# Patient Record
Sex: FEMALE | Race: WHITE | NOT HISPANIC OR LATINO | Employment: FULL TIME | URBAN - METROPOLITAN AREA
[De-identification: names, ages, dates, MRNs, and addresses within clinical notes are randomized per-mention and may not be internally consistent; named-entity substitution may affect disease eponyms.]

---

## 2024-11-06 ENCOUNTER — TELEPHONE (OUTPATIENT)
Dept: PHYSICAL THERAPY | Facility: CLINIC | Age: 59
End: 2024-11-06

## 2024-11-08 ENCOUNTER — APPOINTMENT (OUTPATIENT)
Dept: PHYSICAL THERAPY | Facility: CLINIC | Age: 59
End: 2024-11-08
Payer: COMMERCIAL

## 2024-11-11 ENCOUNTER — APPOINTMENT (OUTPATIENT)
Dept: PHYSICAL THERAPY | Facility: CLINIC | Age: 59
End: 2024-11-11
Payer: COMMERCIAL

## 2024-11-11 ENCOUNTER — TELEPHONE (OUTPATIENT)
Dept: PHYSICAL THERAPY | Facility: CLINIC | Age: 59
End: 2024-11-11

## 2024-11-13 ENCOUNTER — EVALUATION (OUTPATIENT)
Dept: PHYSICAL THERAPY | Facility: CLINIC | Age: 59
End: 2024-11-13
Payer: COMMERCIAL

## 2024-11-13 DIAGNOSIS — M17.12 PRIMARY OSTEOARTHRITIS OF LEFT KNEE: Primary | ICD-10-CM

## 2024-11-13 PROCEDURE — 97112 NEUROMUSCULAR REEDUCATION: CPT

## 2024-11-13 PROCEDURE — 97162 PT EVAL MOD COMPLEX 30 MIN: CPT

## 2024-11-13 NOTE — LETTER
2024    Lucius Mcpherson DO  [address]  [address]  [facility] [address]    Patient: Karly Yost   YOB: 1965   Date of Visit: 2024     Encounter Diagnosis     ICD-10-CM    1. Primary osteoarthritis of left knee  M17.12           Dear Dr. Mcpherson:    Thank you for your recent referral of Karly Yost. Please review the attached evaluation summary from Karly's recent visit.     Please verify that you agree with the plan of care by signing the attached order.     If you have any questions or concerns, please do not hesitate to call.     I sincerely appreciate the opportunity to share in the care of one of your patients and hope to have another opportunity to work with you in the near future.       Sincerely,    Fransisco Randall, PT      Referring Provider:      I certify that I have read the below Plan of Care and certify the need for these services furnished under this plan of treatment while under my care.                    Lucius Mcpherson DO  [address]  [address]  [facility] [address]  Via Fax: [phone]          PT Evaluation     Today's date: 2024  Patient name: Karly Yost  : 1965  MRN: [id number]  Referring provider: Lucius Mcpherson DO  Dx: No diagnosis found.               Assessment  Impairments: abnormal gait, abnormal or restricted ROM, abnormal movement, activity intolerance, impaired physical strength, pain with function, poor body mechanics and activity limitations  Symptom irritability: moderate    Assessment details: Karly Yost is a 59 y.o. female who presents with L pain typically presenting distal to the patella and at times, medially. She also presents with decreased L knee and hip strength in addition to ambulatory dysfunction. Due to these impairments, patient has difficulty performing ADL's, walking to work, engaging in social activities, ambulation, stair negotiation, transfers, most notably sit to stands. Patient's clinical presentation is consistent with  their referring diagnosis of L knee OA in addition to potential L meniscal involvement. Pt presented w/ L knee joint line tenderness and reported catching/popping. This is in addition to a positive Charu's and Thessaly's test. Patient has been educated in home exercise program and plan of care. Patient would benefit from skilled physical therapy services to address their aforementioned functional limitations and progress towards prior level of function and independence with home exercise program in addition to a return to her [employer] office for work.  Understanding of Dx/Px/POC: good     Prognosis: good    Goals  Short Term Goals:  Target Date 4 weeks (12/11/24)  STG1. Initiate and advance HEP to maximize progress between therapy sessions.  STG2. Improve L hip strength to 4/5 to prevent L knee valgus during sit to stand transfer.  STG3. Reduce pain w/ WB activity including walking to <2/10 to improve community participation.    Long Term Goals:  Target Date 12 weeks (2/5/25)  LTG1. Pt to be Indep with HEP  to maximize progress between therapy sessions and improve functional mobility.  LTG2. Pt to tolerate prolonged walking on uneven terrain for 20+ minutes to return to work at her office.  LTG3. Pt to demo L hip strength of 4+/5 or better such that pt can perform reciprocal stair negotiation pain free.       Plan  Patient would benefit from: skilled physical therapy  Planned modality interventions: thermotherapy: hydrocollator packs and cryotherapy    Planned therapy interventions: balance/weight bearing training, body mechanics training, functional ROM exercises, gait training, home exercise program, therapeutic activities, therapeutic exercise, stretching, strengthening, neuromuscular re-education and manual therapy    Frequency: 2x week  Duration in weeks: 12  Plan of Care beginning date: 11/13/2024  Plan of Care expiration date: 2/5/2025    Subjective Evaluation    History of Present Illness  Date of onset:  "10/11/2024  Mechanism of injury: Pt reports to therapy rating her L knee pain as a 3-4/10 currently. Pt has had L knee pain \"on and off\" for a few years. This pain typically presents slightly distal to the patella, but can also present medially. Pt does not recall a particular motion that brings about this pain more than others, but adds that walking and stair climbing has been her more consistent sources of pain to date. Pt is currently working from home, but [facility]. She has to walk 40+minutes for work daily when in the city, and states that she stopped doing so because her knee pain grew too severe. Pt received a Cortizone shot to the L knee on 10/11 which created \"excruciating\" pain at first, but began to reduce pain 48 hours after admission. Following this injection she notes that she had two weeks of \"0/10\" pain. She returned to [place] for work because of this, but walking to work brought about her 4/10 pain. This is what caused the patient to look further into her knee pain and eventually attend PT.           Recurrent probem    Quality of life: good    Patient Goals  Patient goals for therapy: increased strength, independence with ADLs/IADLs, return to work, increased motion, improved balance and decreased pain  Patient goal: [place] shopping w/the family  Pain  Current pain rating: 3  At best pain ratin  At worst pain rating: 10  Location: Distal to L patella, posterior L knee.  Quality: dull ache and pressure  Relieving factors: rest and change in position  Aggravating factors: walking, stair climbing and standing  Progression: worsening    Social Support  Steps to enter house: yes  4  Stairs in house: yes   16  Lives in: multiple-level home  Lives with: spouse    Employment status: working  Hand dominance: right    Treatments  Previous treatment: injection treatment and medication (Meloxicam)    Objective    (*=pain)      Knee AROM: Degrees      R  L  Extension(Quad " set):  0  0  Extension Lag (SLR):   -1  -3  Flexion: (Supine/Prone) 122  117      Strength: MMT  Hip    R  L    IR(Seated/Supine)  4/5*  4+/5*  ER(Seated/Supine)  4+/5  4-/5   Flexion(Seated/Supine) 4/5  4-/5*  Abduction(S/L)  4-/5*  4-/5*  Abduction GM bias  3+/5*  3+/5*    Knee    R  L    Extension(Seated)  4/5*  4-/5*  Flexion(Seated)  5/5  5/5    Ankle    R  L   Dorsiflexion(Seated)  4+/5*  4-/5  Plantarflexion(Seated) 5/5  5/5    L/S Mobility:    Flexion: Min loss  Ext: No loss  Side bend L: Min loss w/pain  Side bend R: No loss    Mechanical Assessment L/S:    Pre-test symptoms include: 4/10 pain to L knee when walking  Repeated Extension in Standing (ANYI): 10x, no change, 10x no change  Repeated Extension in Lying (REIL):  10x, no change    Observation:  Pt has mild-mod clicking in L knee upon rising from chair in addition to L knee valgus.    Tenderness/Palpation:    Pt has moderate TTP to mid joint line of L knee    Special Tests:    Valgus Stress: (-)  Varus Stress: (+)  McMurrary's IR of the tibia + Varus stress = lateral meniscus: (-)  McMurrays ER of the tibia + Valgus stress = medial meniscus: (+)  Thessaly's: (+) on Right  Lachman: (-)    Function:   Squatting: Pt demonstrates L knee valgus upon rising from a chair  Ambulation: Pt demonstrates mild L lean during stance phase on L         Precautions: No past medical history on file.       SOC: 11/13/24  FOTO: 11/13/24  POC Expiration: 2/5/25  Daily Treatment Log:  Date 11/13/24       Visit # 1;IE       Auth         Auth exp        Manual                        There Exer        Squat/STS        Hooklying pullover        Glute bridges                                        HEP        There Activ        Side stepping        Monster walks        Step Ups/Downs                                NMReed 15'       Side lying hip abduction; GM bias 2x10 at rail       Standing hip abduction 2x10 at rail       Standing hip extension 2x10 at rail        Knee ext.  W/add 10x       PPT                        Modalities                                HEP:   Access Code: [email or web address]  URL: [email or web address]  Date: 11/13/2024  Prepared by: Fransisco Randall    Exercises  - Sidelying Hip Abduction  - 1-2 x daily - 7 x weekly - 2-3 sets - 10 reps  - Standing Hip Extension with Counter Support  - 1-2 x daily - 7 x weekly - 2-3 sets - 10 reps

## 2024-11-13 NOTE — PROGRESS NOTES
PT Evaluation     Today's date: 11/15/2024  Patient name: Karly Yost  : 1965  MRN: [id number]  Referring provider: Lucius Mcpherson DO  Dx:   Encounter Diagnosis     ICD-10-CM    1. Primary osteoarthritis of left knee  M17.12           Start Time: 1700  Stop Time: 1745  Total time in clinic (min): 45 minutes    Assessment  Impairments: abnormal gait, abnormal or restricted ROM, abnormal movement, activity intolerance, impaired physical strength, pain with function, poor body mechanics and activity limitations  Symptom irritability: moderate    Assessment details: Karly Yost is a 59 y.o. female who presents with L pain typically presenting distal to the patella and at times, medially. She also presents with decreased L knee and hip strength in addition to ambulatory dysfunction. Due to these impairments, patient has difficulty performing ADL's, walking to work, engaging in social activities, ambulation, stair negotiation, transfers, most notably sit to stands. Patient's clinical presentation is consistent with their referring diagnosis of L knee OA in addition to potential L meniscal involvement. Pt presented w/ L knee joint line tenderness and reported catching/popping. This is in addition to a positive Charu's and Thessaly's test. Patient has been educated in home exercise program and plan of care. Patient would benefit from skilled physical therapy services to address their aforementioned functional limitations and progress towards prior level of function and independence with home exercise program in addition to a return to her [facility] office for work.  Understanding of Dx/Px/POC: good     Prognosis: good    Goals  Short Term Goals:  Target Date 4 weeks (24)  STG1. Initiate and advance HEP to maximize progress between therapy sessions.  STG2. Improve L hip strength to 4/5 to prevent L knee valgus during sit to stand transfer.  STG3. Reduce pain w/ WB activity including walking to <2/10 to improve  "community participation.    Long Term Goals:  Target Date 12 weeks (2/5/25)  LTG1. Pt to be Indep with HEP  to maximize progress between therapy sessions and improve functional mobility.  LTG2. Pt to tolerate prolonged walking on uneven terrain for 20+ minutes to return to work at her office.  LTG3. Pt to demo L hip strength of 4+/5 or better such that pt can perform reciprocal stair negotiation pain free.       Plan  Patient would benefit from: skilled physical therapy  Planned modality interventions: thermotherapy: hydrocollator packs and cryotherapy    Planned therapy interventions: balance/weight bearing training, body mechanics training, functional ROM exercises, gait training, home exercise program, therapeutic activities, therapeutic exercise, stretching, strengthening, neuromuscular re-education and manual therapy    Frequency: 2x week  Duration in weeks: 12  Plan of Care beginning date: 11/13/2024  Plan of Care expiration date: 2/5/2025      Subjective Evaluation    History of Present Illness  Date of onset: 10/11/2024  Mechanism of injury: Pt reports to therapy rating her L knee pain as a 3-4/10 currently. Pt has had L knee pain \"on and off\" for a few years. This pain typically presents slightly distal to the patella, but can also present medially. Pt does not recall a particular motion that brings about this pain more than others, but adds that walking and stair climbing has been her more consistent sources of pain to date. Pt is currently working from home, but typically commutes to [facility] for work. She has to walk 40+minutes for work daily when in the city, and states that she stopped doing so because her knee pain grew too severe. Pt received a Cortizone shot to the L knee on 10/11 which created \"excruciating\" pain at first, but began to reduce pain 48 hours after admission. Following this injection she notes that she had two weeks of \"0/10\" pain. She returned to [place] for work because of this, but walking " to work brought about her 4/10 pain. This is what caused the patient to look further into her knee pain and eventually attend PT.           Recurrent probem    Quality of life: good    Patient Goals  Patient goals for therapy: increased strength, independence with ADLs/IADLs, return to work, increased motion, improved balance and decreased pain  Patient goal: [place] shopping w/the family  Pain  Current pain rating: 3  At best pain ratin  At worst pain rating: 10  Location: Distal to L patella, posterior L knee.  Quality: dull ache and pressure  Relieving factors: rest and change in position  Aggravating factors: walking, stair climbing and standing  Progression: worsening    Social Support  Steps to enter house: yes  4  Stairs in house: yes   16  Lives in: multiple-level home  Lives with: spouse    Employment status: working  Hand dominance: right    Treatments  Previous treatment: injection treatment and medication (Meloxicam)      Objective    (*=pain)      Knee AROM: Degrees      R  L  Extension(Quad set):  0  0  Extension Lag (SLR):   -1  -3  Flexion: (Supine/Prone) 122  117      Strength: MMT  Hip    R  L    IR(Seated/Supine)  4/5*  4+/5*  ER(Seated/Supine)  4+/5  4-/5   Flexion(Seated/Supine) 4/5  4-/5*  Abduction(S/L)  4-/5*  4-/5*  Abduction GM bias  3+/5*  3+/5*    Knee    R  L    Extension(Seated)  4/5*  4-/5*  Flexion(Seated)  5/5  5/5    Ankle    R  L   Dorsiflexion(Seated)  4+/5*  4-/5  Plantarflexion(Seated) 5/5  5/5    L/S Mobility:    Flexion: Min loss  Ext: No loss  Side bend L: Min loss w/pain  Side bend R: No loss    Mechanical Assessment L/S:    Pre-test symptoms include: 4/10 pain to L knee when walking  Repeated Extension in Standing (ANYI): 10x, no change, 10x no change  Repeated Extension in Lying (REIL):  10x, no change    Observation:  Pt has mild-mod clicking in L knee upon rising from chair in addition to L knee valgus.    Tenderness/Palpation:    Pt has moderate TTP to mid joint  line of L knee    Special Tests:    Valgus Stress: (-)  Varus Stress: (+)  McMurrary's IR of the tibia + Varus stress = lateral meniscus: (-)  McMurrays ER of the tibia + Valgus stress = medial meniscus: (+)  Thessaly's: (+) on Right  Lachman: (-)    Function:   Squatting: Pt demonstrates L knee valgus upon rising from a chair  Ambulation: Pt demonstrates mild L lean during stance phase on L         Precautions: History reviewed. No pertinent past medical history.       SOC: 11/13/24  FOTO: 11/13/24  POC Expiration: 2/5/25  Daily Treatment Log:  Date 11/13/24       Visit # 1;IE       Auth         Auth exp        Manual                        There Exer        Squat/STS        Hooklying pullover        Glute bridges                                        HEP        There Activ        Side stepping        Monster walks        Step Ups/Downs                                NMReed 15'       Side lying hip abduction; GM bias 2x10 at rail       Standing hip abduction 2x10 at rail       Standing hip extension 2x10 at rail        Knee ext. W/add 10x       PPT                        Modalities                                HEP:   Access Code: [email or web address]  URL: [email or web address]  Date: 11/13/2024  Prepared by: Fransisco Randall    Exercises  - Sidelying Hip Abduction  - 1-2 x daily - 7 x weekly - 2-3 sets - 10 reps  - Standing Hip Extension with Counter Support  - 1-2 x daily - 7 x weekly - 2-3 sets - 10 reps

## 2024-11-15 ENCOUNTER — OFFICE VISIT (OUTPATIENT)
Dept: PHYSICAL THERAPY | Facility: CLINIC | Age: 59
End: 2024-11-15
Payer: COMMERCIAL

## 2024-11-15 DIAGNOSIS — M17.12 PRIMARY OSTEOARTHRITIS OF LEFT KNEE: Primary | ICD-10-CM

## 2024-11-15 PROCEDURE — 97112 NEUROMUSCULAR REEDUCATION: CPT

## 2024-11-15 PROCEDURE — 97110 THERAPEUTIC EXERCISES: CPT

## 2024-11-15 PROCEDURE — 97530 THERAPEUTIC ACTIVITIES: CPT

## 2024-11-15 NOTE — PROGRESS NOTES
"Daily Note     Today's date: 11/15/2024  Patient name: Karly Yost  : 1965  MRN: [id number]  Referring provider: Lucius Mcpherson DO  Dx:   Encounter Diagnosis     ICD-10-CM    1. Primary osteoarthritis of left knee  M17.12                      Subjective: pt reports that she had to go into the city for work yesterday it bothered her by the end of the day 2/10 but she relaxed after that and was okay. On arrival to session she has some pressure in L knee but wouldn't call it pain.      Objective: See treatment diary below  3/10 pain w/ LAQ after STS   Repeated L knee ext w/ self OP 1x5= pain during; NE on pain during LAQ      Assessment: Tolerated treatment well. Increased pain post session 3/10 w/ TE. Pt did dem some relief w/ KT to her medial knee, pt to cont wear for 2-3 days and assess for cont relief. Edu on wear time and removal of taping. Cont to progress global LE strengthening as tolerated per symptom irritability.  Patient would benefit from continued PT      Plan: Continue per plan of care.      Precautions: History reviewed. No pertinent past medical history.       SOC: 24  FOTO: 24  POC Expiration: 25  Daily Treatment Log:  Date 11/13/24 11/15/2024      Visit # 1;IE 2       Auth         Auth exp        Manual        KT medial \"C\" L knee   RB               There Exer  15'       Squat/STS  STS elevated 25\" mat 1x10       Hooklying pullover        Hooklying clam shells   RTB 1x10       Glute bridges  1x10       SLR flex w/ quad set  1x10                              HEP        There Activ  10'       Upright bike  L2x5'       Side stepping  15'       Monster walks  15'       Step Ups/Downs                                NMReed 15' 13'      Side lying hip abduction; GM bias 2x10 at rail       Standing hip abduction 2x10 at rail 1x10       Standing hip extension 2x10 at rail  1x10       Knee ext. W/add 10x 2x10       PPT                        Modalities                                HEP: "   Access Code: [email or web address]  URL: [email or web address]  Date: 11/13/2024  Prepared by: Fransisco Randall    Exercises  - Sidelying Hip Abduction  - 1-2 x daily - 7 x weekly - 2-3 sets - 10 reps  - Standing Hip Extension with Counter Support  - 1-2 x daily - 7 x weekly - 2-3 sets - 10 reps

## 2024-11-18 ENCOUNTER — OFFICE VISIT (OUTPATIENT)
Dept: PHYSICAL THERAPY | Facility: CLINIC | Age: 59
End: 2024-11-18
Payer: COMMERCIAL

## 2024-11-18 DIAGNOSIS — M17.12 PRIMARY OSTEOARTHRITIS OF LEFT KNEE: Primary | ICD-10-CM

## 2024-11-18 PROCEDURE — 97110 THERAPEUTIC EXERCISES: CPT

## 2024-11-18 PROCEDURE — 97112 NEUROMUSCULAR REEDUCATION: CPT

## 2024-11-18 NOTE — PROGRESS NOTES
"Daily Note     Today's date: 2024  Patient name: Karly Yost  : 1965  MRN: [id number]  Referring provider: Lucius Mcpherson DO  Dx:   Encounter Diagnosis     ICD-10-CM    1. Primary osteoarthritis of left knee  M17.12                      Subjective: Pt reports to therapy stating that she is feeling \"much better\" noting that she can even experience times of 0/10 pain, though typically her pain rests at a consistent 2/10 and can spike to 3-4/10 following prolonged sitting and/or stair climbing.    Objective: See treatment diary below      Assessment: Tolerated treatment well. Pt performed both side-stepping and monster walks w/resistance and had no c/o pain or discomfort when doing so. TRX squats were also added to pt's plan to reinforce glute recruitment during this motion while managing an external load. Patient would benefit from continued PT to reduce pain in her L knee while increasing stability of both the L knee and hip joint to improve activity tolerance. PT continues to experience soreness following work days and walking in the city, so plan to stagger in-person work days to rest her knee was discussed and will be attempted by pt. HEP updated to include side-stepping, monster walks, and sit to stand interventions.      Plan: Continue per plan of care.      Precautions: History reviewed. No pertinent past medical history.       SOC: 24  FOTO: 24  POC Expiration: 25  Daily Treatment Log:  Date 11/13/24 11/15/2024 11/18/24     Visit # 1;IE 2  3     Auth         Auth exp        Manual        KT medial \"C\" L knee   RB               There Exer  15'  10'     Squat/STS  STS elevated 25\" mat 1x10  STS elevated 25\" mat 1x10      TRX squat   2x10     Hooklying pullover        Hooklying clam shells   RTB 1x10       Glute bridges  1x10       SLR flex w/ quad set  1x10                              HEP   Updated     There Activ  10'  10'     Upright bike  L2x5'  L2x5'      Side stepping  15'  15', " 3 laps, YTB at ankles     Monster walks  15'  15', 3 laps, YTB     Step Ups/Downs                                NMReed 15' 13' 25'     Side lying hip abduction; GM bias 2x10 at rail  2x10 in side lying      Standing hip abduction 2x10 at rail 1x10       Standing hip extension 2x10 at rail  1x10  2x10     Knee ext. W/add 10x 2x10  2x10      PPT   10x3s     HL cane pullover   10x w/SPC             Modalities                                Access Code: [email or web address]  URL: [email or web address]  Date: 11/18/2024  Prepared by: Fransisco Randall    Exercises  - Sidelying Hip Abduction  - 1-2 x daily - 7 x weekly - 2-3 sets - 10 reps  - Standing Hip Extension with Counter Support  - 1-2 x daily - 7 x weekly - 2-3 sets - 10 reps  - Side Stepping with Resistance at Ankles  - 1-2 x daily - 7 x weekly - 2-3 sets - 10 reps  - Forward Monster Walk with Resistance (BKA)  - 1-2 x daily - 7 x weekly - 2-3 sets - 10 reps  - Sit to Stand  - 1-2 x daily - 7 x weekly - 2-3 sets - 10 reps

## 2024-11-20 ENCOUNTER — OFFICE VISIT (OUTPATIENT)
Dept: PHYSICAL THERAPY | Facility: CLINIC | Age: 59
End: 2024-11-20
Payer: COMMERCIAL

## 2024-11-20 DIAGNOSIS — M17.12 PRIMARY OSTEOARTHRITIS OF LEFT KNEE: Primary | ICD-10-CM

## 2024-11-20 PROCEDURE — 97112 NEUROMUSCULAR REEDUCATION: CPT

## 2024-11-20 PROCEDURE — 97110 THERAPEUTIC EXERCISES: CPT

## 2024-11-20 PROCEDURE — 97530 THERAPEUTIC ACTIVITIES: CPT

## 2024-11-20 NOTE — PROGRESS NOTES
"Daily Note     Today's date: 2024  Patient name: Karly Yost  : 1965  MRN: [id number]  Referring provider: Lucius Mcpherson DO  Dx:   Encounter Diagnosis     ICD-10-CM    1. Primary osteoarthritis of left knee  M17.12                      Subjective: Pt reports to therapy citing a 3/10 pain that increased to a 4/10 following the past two straight days of walking [place] the city for [place]. Pt adds that she had to ascend 35 stairs in [place] on these days. This pain presents primarily superior to the L patella and migrates medially as it intensifies. Pt denies any buckling or LOB.      Objective: See treatment diary below      Assessment: Tolerated treatment well. Pt performed a step up intervention w/theraband wrapped around L knee and pulled medially to encourage L abductors/glute recruitment to enhance stability around the knee. Pt reported less discomfort with this method than w/o theraband. Additionally, pt found relief from PT L knee distraction that reduced pain during LAQ. Patient would benefit from continued PT to enhance both L knee and hip stability while increasing activity tolerance to improve community ambulation.       Plan: Continue per plan of care.      Precautions: History reviewed. No pertinent past medical history.       SOC: 24  FOTO: 24  POC Expiration: 25  Daily Treatment Log:  Date 11/13/24 11/15/2024 11/18/24 11/20    Visit # 1;IE 2  3 4    Auth         Auth exp        Manual    5'    KT medial \"C\" L knee   RB       L knee distraction w/band    SJ            There Exer  15'  10' 10'    Squat/STS  STS elevated 25\" mat 1x10  STS elevated 25\" mat 1x10      TRX squat   2x10 2x10 to chair    Hooklying pullover        Hooklying clam shells   RTB 1x10       Glute bridges  1x10   [id number]; 1x10w/RTB and PPT    SLR flex w/ quad set                                HEP   Updated     There Activ  10'  10' 15'    Upright bike  L2x5'  L2x5'      Side stepping  15'  15', 3 laps, YTB " at ankles 15', 3 laps, YTB at ankles    Monster walks including retro walks  15'  15', 3 laps, YTB 15', 3 laps, YTB at ankles    Step Ups/Downs    Step ups w/PT ABduction resistance via YTB                            NMReed 15' 13' 25' 15'    Side lying hip abduction; GM bias 2x10 at rail  2x10 in side lying  2x10 w/1#    Standing hip abduction 2x10 at rail 1x10       Standing hip extension 2x10 at rail  1x10  2x10     Knee ext. W/add 10x 2x10  2x10  2x10; min-mod pain to medial R knee, relieved by distraction    PPT   10x3s 10x3s    HL cane pullover   10x w/SPC             Modalities                                Access Code: [email or web address]  URL: [email or web address]  Date: 11/18/2024  Prepared by: Fransisco Randall    Exercises  - Sidelying Hip Abduction  - 1-2 x daily - 7 x weekly - 2-3 sets - 10 reps  - Standing Hip Extension with Counter Support  - 1-2 x daily - 7 x weekly - 2-3 sets - 10 reps  - Side Stepping with Resistance at Ankles  - 1-2 x daily - 7 x weekly - 2-3 sets - 10 reps  - Forward Monster Walk with Resistance (BKA)  - 1-2 x daily - 7 x weekly - 2-3 sets - 10 reps  - Sit to Stand  - 1-2 x daily - 7 x weekly - 2-3 sets - 10 reps

## 2024-11-22 ENCOUNTER — OFFICE VISIT (OUTPATIENT)
Dept: PHYSICAL THERAPY | Facility: CLINIC | Age: 59
End: 2024-11-22
Payer: COMMERCIAL

## 2024-11-22 DIAGNOSIS — M17.12 PRIMARY OSTEOARTHRITIS OF LEFT KNEE: Primary | ICD-10-CM

## 2024-11-22 PROCEDURE — 97110 THERAPEUTIC EXERCISES: CPT

## 2024-11-22 PROCEDURE — 97530 THERAPEUTIC ACTIVITIES: CPT

## 2024-11-22 PROCEDURE — 97112 NEUROMUSCULAR REEDUCATION: CPT

## 2024-11-22 NOTE — PROGRESS NOTES
"Daily Note     Today's date: 2024  Patient name: Karly Yost  : 1965  MRN: [id number]  Referring provider: Lucius Mcpherson DO  Dx:   Encounter Diagnosis     ICD-10-CM    1. Primary osteoarthritis of left knee  M17.12                      Subjective: Pt reports to therapy feeling \"0-1/10\" pain in the L knee at rest, but this pain can increase to \"3-4/10\"  following increased activity, such as walking for long distances and/or stair negotiations.       Objective: See treatment diary below      Assessment: Tolerated treatment well. Pt performed side stepping today w/ increased resistance in addition to UE supported squats w/1s pause at bottom. No c/o pain or discomfort were made, and pt displays strong understanding of maintaining proper form. Pt continues to find relief from L knee distraction w/blue belt. Patient would benefit from continued PT to increase activity tolerance of L knee.       Plan: Continue per plan of care.       Precautions: History reviewed. No pertinent past medical history.       SOC: 24  FOTO: 24  POC Expiration: 25  Daily Treatment Log:  Date 11/13/24 11/15/2024 11/18/24 11/20 11/22/24   Visit # 1;IE 2  3 4 5; FOTO   Auth         Auth exp        Manual    5' 5'   KT medial \"C\" L knee   RB       L knee distraction w/band    SJ SJ           There Exer  15'  10' 10' 5'   Squat/STS  STS elevated 25\" mat 1x10  STS elevated 25\" mat 1x10      TRX squat   2x10 2x10 to chair 2x10 to chair; 1s pause at bottom   Hooklying pullover        Hooklying clam shells   RTB 1x10       Glute bridges  1x10   [id number]; 1x10w/RTB and PPT    SLR flex w/ quad set                                HEP   Updated     There Activ  10'  10' 15' 20'   Upright bike  L2x5'  L2x5'   Recumbent; L2x5'    Side stepping  15'  15', 3 laps, YTB at ankles 15', 3 laps, YTB at ankles 15', 3 laps, RTB at ankles   Monster walks including retro walks  15'  15', 3 laps, YTB 15', 3 laps, YTB at ankles    Step Ups/Downs "    Step ups w/PT ABduction resistance via YTB 10x on R/L   Lateral Step ups/downs     10x R/L                   NMReed 15' 13' 25' 15' 15'   Side lying hip abduction; GM bias 2x10 at rail  2x10 in side lying  2x10 w/1# 2x10 w/1# in standing at counter   Standing hip abduction 2x10 at rail 1x10       Standing hip extension 2x10 at rail  1x10  2x10  2x10 w/1# in standing at counter   Knee ext. W/add 10x 2x10  2x10  2x10; min-mod pain to medial R knee, relieved by distraction 2x10; min-mod pain to medial R knee, relieved by distraction   PPT   10x3s 10x3s    HL cane pullover   10x w/SPC     Palloff Press w/band around ankles                Modalities                                Access Code: [email or web address]  URL: [email or web address]  Date: 11/18/2024  Prepared by: Fransisco Randall    Exercises  - Sidelying Hip Abduction  - 1-2 x daily - 7 x weekly - 2-3 sets - 10 reps  - Standing Hip Extension with Counter Support  - 1-2 x daily - 7 x weekly - 2-3 sets - 10 reps  - Side Stepping with Resistance at Ankles  - 1-2 x daily - 7 x weekly - 2-3 sets - 10 reps  - Forward Monster Walk with Resistance (BKA)  - 1-2 x daily - 7 x weekly - 2-3 sets - 10 reps  - Sit to Stand  - 1-2 x daily - 7 x weekly - 2-3 sets - 10 reps

## 2024-11-25 ENCOUNTER — OFFICE VISIT (OUTPATIENT)
Dept: PHYSICAL THERAPY | Facility: CLINIC | Age: 59
End: 2024-11-25
Payer: COMMERCIAL

## 2024-11-25 DIAGNOSIS — M17.12 PRIMARY OSTEOARTHRITIS OF LEFT KNEE: Primary | ICD-10-CM

## 2024-11-25 PROCEDURE — 97110 THERAPEUTIC EXERCISES: CPT

## 2024-11-25 PROCEDURE — 97112 NEUROMUSCULAR REEDUCATION: CPT

## 2024-11-25 PROCEDURE — 97530 THERAPEUTIC ACTIVITIES: CPT

## 2024-11-25 NOTE — PROGRESS NOTES
"Daily Note     Today's date: 2024  Patient name: Karly Yost  : 1965  MRN: [id number]  Referring provider: Lucius Mcpherson DO  Dx:   Encounter Diagnosis     ICD-10-CM    1. Primary osteoarthritis of left knee  M17.12                      Subjective: Pt reports to therapy stating that she \"might have over done it\" when going out w/family this past Friday night. PT adds that she was on her feet for 4+ hours and her pain increased \"to around a 4/10\" and presented superior to the patella in addition to the medial side of the L knee. Pt notes that this pain has decreased with rest and elevation.       Objective: See treatment diary below      Assessment: Tolerated treatment well. Pt demonstrates strong understanding of how to involve her hips when performing a step-up intervention following VCs. Pt found pain relief during step up when using this technique from 3/10 to 0/10. Patient would benefit from continued PT to ensure proper LE mechanics during all functional motions including tranfers and gait training. Pt continues to find relief using blue belt distraction technique by PT.       Plan: Continue per plan of care.      Precautions: History reviewed. No pertinent past medical history.       SOC: 24  FOTO: 24  POC Expiration: 25  Daily Treatment Log:  Date 11/25/24 11/15/2024 11/18/24 11/20 11/22/24   Visit # 6 2  3 4 5; FOTO   Auth         Auth exp        Manual    5' 5'   KT medial \"C\" L knee   RB       L knee distraction w/band SJ   SJ SJ           There Exer 10' 15'  10' 10' 5'   Squat/STS  STS elevated 25\" mat 1x10  STS elevated 25\" mat 1x10      TRX squat 10x w/GTB at ankles; 10x w/GTB and 1sec pause at bottom  2x10 2x10 to chair 2x10 to chair; 1s pause at bottom   Hooklying pullover        Hooklying clam shells  U/L w/RTB; 2x10 R/L RTB 1x10       Glute bridges  1x10   [id number]; 1x10w/RTB and PPT    SLR flex w/ quad set                                HEP   Updated     There Activ 25' " "10'  10' 15' 20'   Upright bike Recumbent; L2x5'  L2x5'  L2x5'   Recumbent; L2x5'    Side stepping 15', 3 laps, GTB at ankles 15'  15', 3 laps, YTB at ankles 15', 3 laps, YTB at ankles 15', 3 laps, RTB at ankles   Monster walks including retro walks W/HARJIT; 12.5#, 5 laps 15'  15', 3 laps, YTB 15', 3 laps, YTB at ankles    Step Ups/Downs 8\",    Step ups w/PT ABduction resistance via YTB 10x on R/L   Lateral Step ups/downs 10x R/L    10x R/L                   NMReed 10' 13' 25' 15' 15'   Side lying hip abduction; GM bias   2x10 in side lying  2x10 w/1# 2x10 w/1# in standing at counter   Standing hip abduction  1x10       Standing hip extension  1x10  2x10  2x10 w/1# in standing at counter   Knee ext. W/add 2x10 w1#; min-mod pain to medial R knee, relieved by distraction 2x10  2x10  2x10; min-mod pain to medial R knee, relieved by distraction 2x10; min-mod pain to medial R knee, relieved by distraction   PPT   10x3s 10x3s    HL cane pullover   10x w/SPC     Palloff Press w/band around ankles W/HARJIT; 7.5# 2x10 R/L               Modalities                                Access Code: [email or web address]  URL: [email or web address]  Date: 11/18/2024  Prepared by: Fransisco Randall    Exercises  - Sidelying Hip Abduction  - 1-2 x daily - 7 x weekly - 2-3 sets - 10 reps  - Standing Hip Extension with Counter Support  - 1-2 x daily - 7 x weekly - 2-3 sets - 10 reps  - Side Stepping with Resistance at Ankles  - 1-2 x daily - 7 x weekly - 2-3 sets - 10 reps  - Forward Monster Walk with Resistance (BKA)  - 1-2 x daily - 7 x weekly - 2-3 sets - 10 reps  - Sit to Stand  - 1-2 x daily - 7 x weekly - 2-3 sets - 10 reps                  "

## 2024-11-27 ENCOUNTER — OFFICE VISIT (OUTPATIENT)
Dept: PHYSICAL THERAPY | Facility: CLINIC | Age: 59
End: 2024-11-27
Payer: COMMERCIAL

## 2024-11-27 DIAGNOSIS — M17.12 PRIMARY OSTEOARTHRITIS OF LEFT KNEE: Primary | ICD-10-CM

## 2024-11-27 PROCEDURE — 97530 THERAPEUTIC ACTIVITIES: CPT

## 2024-11-27 PROCEDURE — 97112 NEUROMUSCULAR REEDUCATION: CPT

## 2024-11-27 NOTE — PROGRESS NOTES
"Daily Note     Today's date: 2024  Patient name: Karly Yost  : 1965  MRN: [id number]  Referring provider: Lucius Mcpherson DO  Dx:   Encounter Diagnosis     ICD-10-CM    1. Primary osteoarthritis of left knee  M17.12                      Subjective: Pt reports an increase of pain up to a \"5/10\" on Monday evening after performing HEP approximately 1 hour following PT. Pt advised to space out her interventions w/at least 4-6 hours between sessions. PT adds that following rest that evening her knee was \"back to it's normal\" s/x presentation the following day.       Objective: See treatment diary below      Assessment: Tolerated treatment well. Pt continues to find relief when recruiting L glute during step-up interventions. PT reported 2/10 pain w/ step up intervention initially. Following adduction force w/YTB to recruit extension/abduction musculature during step-up intervention, pt describes her pain as 0/10. RDLs were added to POC today to further hip extension strength while teaching pt how to recruit stabilizing ABduction musculature during hip extension. Patient would benefit from continued PT to increase L knee and hip stability when ambulating/negotiating stairs at work.       Plan: Continue per plan of care.      Precautions: History reviewed. No pertinent past medical history.       SOC: 24  FOTO: 24  POC Expiration: 25  Daily Treatment Log:  Date 24   Visit # 6 7 3 4 5; FOTO   Auth         Auth exp        Manual    5' 5'   KT medial \"C\" L knee         L knee distraction w/band SJ SJ  SJ SJ           There Exer 10' 5' 10' 10' 5'   Squat/STS   STS elevated 25\" mat 1x10      TRX squat 10x w/GTB at ankles; 10x w/GTB and 1sec pause at bottom 10x w/GTB at ankles; 10x w/GTB and 1sec pause at bottom 2x10 2x10 to chair 2x10 to chair; 1s pause at bottom   Hooklying pullover        Hooklying clam shells  U/L w/RTB; 2x10 R/L       Glute bridges    " "[id number]; 1x10w/RTB and PPT    SLR flex w/ quad set                                HEP   Updated     There Activ 25' 15' 10' 15' 20'   Upright bike Recumbent; L2x5'   L2x5'   Recumbent; L2x5'    Side stepping 15', 3 laps, GTB at ankles Zig-Zag stepping w/GTB at ankles;15'x4 laps 15', 3 laps, YTB at ankles 15', 3 laps, YTB at ankles 15', 3 laps, RTB at ankles   Monster walks including retro walks W/HARJIT; 12.5#, 5 laps W/HARJIT; 12.5#, 5 laps 15', 3 laps, YTB 15', 3 laps, YTB at ankles    Step Ups/Downs 8\",  Step ups w/PT ABduction resistance via YTB  Step ups w/PT ABduction resistance via YTB 10x on R/L   Lateral Step ups/downs 10x R/L    10x R/L                   NMReed 10' 25' 25' 15' 15'   Side lying hip abduction; GM bias  2x10 w/1# in standing at counter 2x10 in side lying  2x10 w/1# 2x10 w/1# in standing at counter   Standing hip abduction        Standing hip extension   2x10  2x10 w/1# in standing at counter   RDL w/SPC  2x10w/5# ankle weight on SPC      Knee ext. W/add 2x10 w1#; min-mod pain to medial R knee, relieved by distraction 2x10 w2#; min-mod pain to medial R knee, relieved by distraction 2x10  2x10; min-mod pain to medial R knee, relieved by distraction 2x10; min-mod pain to medial R knee, relieved by distraction   PPT   10x3s 10x3s    HL cane pullover   10x w/SPC     Palloff Press w/band around ankles W/HARJIT; 7.5# 2x10 R/L W/BTB; 2x10, YTB at ankles              Modalities                                Access Code: [email or web address]  URL: [email or web address]  Date: 11/18/2024  Prepared by: Fransisco Randall    Exercises  - Sidelying Hip Abduction  - 1-2 x daily - 7 x weekly - 2-3 sets - 10 reps  - Standing Hip Extension with Counter Support  - 1-2 x daily - 7 x weekly - 2-3 sets - 10 reps  - Side Stepping with Resistance at Ankles  - 1-2 x daily - 7 x weekly - 2-3 sets - 10 reps  - Forward Monster Walk with Resistance (BKA)  - 1-2 x daily - 7 x weekly - 2-3 sets - 10 reps  - Sit to Stand  - " 1-2 x daily - 7 x weekly - 2-3 sets - 10 reps

## 2024-12-02 ENCOUNTER — OFFICE VISIT (OUTPATIENT)
Dept: PHYSICAL THERAPY | Facility: CLINIC | Age: 59
End: 2024-12-02
Payer: COMMERCIAL

## 2024-12-02 DIAGNOSIS — M17.12 PRIMARY OSTEOARTHRITIS OF LEFT KNEE: Primary | ICD-10-CM

## 2024-12-02 PROCEDURE — 97110 THERAPEUTIC EXERCISES: CPT

## 2024-12-02 PROCEDURE — 97112 NEUROMUSCULAR REEDUCATION: CPT

## 2024-12-02 PROCEDURE — 97530 THERAPEUTIC ACTIVITIES: CPT

## 2024-12-02 NOTE — PROGRESS NOTES
"Daily Note     Today's date: 2024  Patient name: Karly Yost  : 1965  MRN: [id number]  Referring provider: Lucius Mcpherson DO  Dx:   Encounter Diagnosis     ICD-10-CM    1. Primary osteoarthritis of left knee  M17.12                        Subjective: Pt states that she thinks her R knee pain is worsening; states that primary PT is aware of R knee pain. Describes this pain to be similar to L. States that she has not completed HEP over the last few days. Friday she cut down her [place] tree which she feels exacerbated L knee pain. States that her L knee pain was still present but improving on Saturday; she was decorating for the holidays and shopping. On  her knee pain improved even more even with decorating her tree. She feels PT is helping. Pt reports 3/10 R knee pain and 1/10 L knee pain on arrival to session.       Objective: See treatment diary below      Assessment: Pt with report of increased L knee pain with squats at counter; cued patient to limit excessive anterior tibial translation on descent and to limit ROM to keep movement pain-free; pt demonstrates improved tolerance to exercises initially following cues, however, she reports increased discomfort in L knee post squat. Plan to proceed with caution in upcoming sessions. No complaints with additional TE. Pt with report of 1/10 R knee pain and 2/10 L knee pain end of session. Tolerated treatment well. Patient would benefit from continued PT.     Plan: Continue per plan of care.      Precautions: History reviewed. No pertinent past medical history.       SOC: 24  FOTO: 24  POC Expiration: 25  Daily Treatment Log:  Date 24 Next session 24   Visit # 6 7 8  5; FOTO   Auth         Auth exp        Manual     5'   KT medial \"C\" L knee         L knee distraction w/band SJ SJ   SJ           There Exer 10' 5' 15'  5'   Squat/STS        TRX squat 10x w/GTB at ankles; 10x w/GTB and 1sec pause at bottom " "10x w/GTB at ankles; 10x w/GTB and 1sec pause at bottom Squat w/ counter support 2x10 YTB ankles  2x10 to chair; 1s pause at bottom   Hooklying pullover        Hooklying clam shells  U/L w/RTB; 2x10 R/L  2x10 R/L RTB     Glute bridges   1x10 RTB abd     SLR flex w/ quad set        Stand march   2x10 R/L w/ counter support                     HEP        There Activ 25' 15' 15'  20'   Upright bike Recumbent; L2x5'   L2x5'   Recumbent; L2x5'    Side stepping 15', 3 laps, GTB at ankles Zig-Zag stepping w/GTB at ankles;15'x4 laps GTB at ankles;15'x4 laps  15', 3 laps, RTB at ankles   Monster walks including retro walks W/HARJIT; 12.5#, 5 laps W/HARJIT; 12.5#, 5 laps At rail GTB prox knee 12'x3 fwd/bkw       Step Ups/Downs 8\",  Step ups w/PT ABduction resistance via YTB 8\" step-up 1 x10 R/L near rail w/ CS  10x on R/L   Lateral Step ups/downs 10x R/L    10x R/L                   NMReed 10' 25' 15'  15'   Side lying hip abduction; GM bias  2x10 w/1# in standing at counter 2x10 w/1# in standing at counter  2x10 w/1# in standing at counter   Standing hip abduction        Standing hip extension     2x10 w/1# in standing at counter   RDL w/SPC  2x10w/5# ankle weight on SPC 2x10 w/5# ankle weight on SPC VC for form     Knee ext. W/add 2x10 w1#; min-mod pain to medial R knee, relieved by distraction 2x10 w2#; min-mod pain to medial R knee, relieved by distraction   2x10; min-mod pain to medial R knee, relieved by distraction   PPT        HL cane pullover        Palloff Press w/band around ankles W/HARJIT; 7.5# 2x10 R/L W/BTB; 2x10, YTB at ankles [place] 6# 2x10 YTB at ankles     SL         Modalities                                Access Code: [email or web address]  URL: [email or web address]  Date: 11/18/2024  Prepared by: Fransisco Randall    Exercises  - Sidelying Hip Abduction  - 1-2 x daily - 7 x weekly - 2-3 sets - 10 reps  - Standing Hip Extension with Counter Support  - 1-2 x daily - 7 x weekly - 2-3 sets - 10 reps  - Side " Stepping with Resistance at Ankles  - 1-2 x daily - 7 x weekly - 2-3 sets - 10 reps  - Forward Monster Walk with Resistance (BKA)  - 1-2 x daily - 7 x weekly - 2-3 sets - 10 reps  - Sit to Stand  - 1-2 x daily - 7 x weekly - 2-3 sets - 10 reps

## 2024-12-04 ENCOUNTER — OFFICE VISIT (OUTPATIENT)
Dept: PHYSICAL THERAPY | Facility: CLINIC | Age: 59
End: 2024-12-04
Payer: COMMERCIAL

## 2024-12-04 DIAGNOSIS — M17.12 PRIMARY OSTEOARTHRITIS OF LEFT KNEE: Primary | ICD-10-CM

## 2024-12-04 PROCEDURE — 97112 NEUROMUSCULAR REEDUCATION: CPT | Performed by: PHYSICAL THERAPIST

## 2024-12-04 PROCEDURE — 97110 THERAPEUTIC EXERCISES: CPT | Performed by: PHYSICAL THERAPIST

## 2024-12-04 PROCEDURE — 97530 THERAPEUTIC ACTIVITIES: CPT | Performed by: PHYSICAL THERAPIST

## 2024-12-04 NOTE — PROGRESS NOTES
"Daily Note     Today's date: 2024  Patient name: Karly Yost  : 1965  MRN: [id number]  Referring provider: Lucius Mcpherson DO  Dx:   Encounter Diagnosis     ICD-10-CM    1. Primary osteoarthritis of left knee  M17.12                      Subjective: Pt states her right knee seems worse; left knee seems better. R knee hurts w/ prolonged walking/standing.      Objective: See treatment diary below;  Used fwd step up as comparable sign. After application of PF taping to R knee, pt reports pain is abolished.  Updated HEP to increase focus on VMO.      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 Patient would benefit from continued PT      Plan: Progress treatment as tolerated.       Precautions: History reviewed. No pertinent past medical history.       SOC: 24  FOTO: 24  POC Expiration: 25  Daily Treatment Log:  Date 24    Visit # 6 7 8 9    Auth         Auth exp        Manual    5'    KT medial \"C\" L knee         L knee distraction w/band SJ SJ      R PF tape for lateral tilt correction        There Exer 10' 5' 15' 15'    Squat/STS    STS low mat 1x10 22\" height - mild L knee pain    TRX squat 10x w/GTB at ankles; 10x w/GTB and 1sec pause at bottom 10x w/GTB at ankles; 10x w/GTB and 1sec pause at bottom Squat w/ counter support 2x10 YTB ankles     Hooklying pullover        Hooklying clam shells  U/L w/RTB; 2x10 R/L  2x10 R/L RTB     Glute bridges   1x10 RTB abd 5\"x10; 2x w/ grn TB    SLR flex w/ quad set        Stand march   2x10 R/L w/ counter support                     HEP    Update & review    There Activ 25' 15' 15' 10'    Upright bike Recumbent; L2x5'   L2x5'  L2X5'    Side stepping 15', 3 laps, GTB at ankles Zig-Zag stepping w/GTB at ankles;15'x4 laps GTB at ankles;15'x4 laps     Monster walks including retro walks W/HARJIT; 12.5#, 5 laps W/HARJIT; 12.5#, 5 laps At rail GTB prox knee 12'x3 fwd/bkw   Open " "space RTB @ ankles 15'x3 ea fwd/bkwd    Step Ups/Downs 8\",  Step ups w/PT ABduction resistance via YTB 8\" step-up 1 x10 R/L near rail w/ CS 8\" step 1x10 R w/ PF tape no c/o    Lateral Step ups/downs 10x R/L                       NMReed 10' 25' 15' 15'    Side lying hip abduction; GM bias  2x10 w/1# in standing at counter 2x10 w/1# in standing at counter     B/L SAQ w/ ball sq @ ankles    5\"x10; 2x    Standing TKE w/ ball vs wall    5\"x10 ea R/L    RDL w/SPC  2x10w/5# ankle weight on SPC 2x10 w/5# ankle weight on SPC VC for form 2x10 w/ 5# wgt on SPC    Knee ext. W/add 2x10 w1#; min-mod pain to medial R knee, relieved by distraction 2x10 w2#; min-mod pain to medial R knee, relieved by distraction      HL cane pullover        Palloff Press w/band around ankles W/KAILEE; 7.5# 2x10 R/L W/BTB; 2x10, YTB at ankles [place] 6# 2x10 YTB at ankles     SL         Modalities                                HEP:  Access Code: [email or web address]  URL: [email or web address]  Date: 12/04/2024  Prepared by: Vicki Green    Exercises  - Sidelying Hip Abduction  - 1-2 x daily - 7 x weekly - 2-3 sets - 10 reps  - Side Stepping with Resistance at Ankles  - 1-2 x daily - 7 x weekly - 2-3 sets - 10 reps  - Forward Monster Walk with Resistance (BKA)  - 1-2 x daily - 7 x weekly - 2-3 sets - 10 reps  - Sit to Stand  - 1-2 x daily - 7 x weekly - 2-3 sets - 10 reps  - Short Arc Quad with Ball Squeeze  - 1 x daily - 7 x weekly - 2 sets - 10 reps - 5 hold  - Supine Bridge with Resistance Band  - 1 x daily - 7 x weekly - 2 sets - 10 reps - 5 hold               "

## 2024-12-09 ENCOUNTER — EVALUATION (OUTPATIENT)
Dept: PHYSICAL THERAPY | Facility: CLINIC | Age: 59
End: 2024-12-09
Payer: COMMERCIAL

## 2024-12-09 DIAGNOSIS — M17.12 PRIMARY OSTEOARTHRITIS OF LEFT KNEE: Primary | ICD-10-CM

## 2024-12-09 PROCEDURE — 97530 THERAPEUTIC ACTIVITIES: CPT

## 2024-12-09 PROCEDURE — 97110 THERAPEUTIC EXERCISES: CPT

## 2024-12-09 NOTE — PROGRESS NOTES
PT Evaluation     Today's date: 2024  Patient name: Karly Yost  : 1965  MRN: [id number]  Referring provider: Lucius Mcpherson DO  Dx:   Encounter Diagnosis     ICD-10-CM    1. Primary osteoarthritis of left knee  M17.12             Start Time:   Stop Time:   Total time in clinic (min): 45 minutes    Assessment  Impairments: abnormal or restricted ROM, abnormal movement, activity intolerance, impaired physical strength, pain with function, poor body mechanics and activity limitations  Symptom irritability: moderate    Assessment details: Karly Yost is a 59 y.o. female who presents with L pain typically presenting distal to the patella and at times, medially. She also presents with decreased L knee and hip strength in addition to ambulatory dysfunction. Due to these impairments, patient has difficulty performing ADL's, walking to work, engaging in social activities, ambulation, stair negotiation, transfers, most notably sit to stands. Patient's clinical presentation is consistent with their referring diagnosis of L knee OA in addition to potential L meniscal involvement. Pt presented w/ L knee joint line tenderness and reported catching/popping. This is in addition to a positive Charu's and Thessaly's test. Patient has been educated in home exercise program and plan of care. Patient would benefit from skilled physical therapy services to address their aforementioned functional limitations and progress towards prior level of function and independence with home exercise program in addition to a return to her [facility] office for work.    2024:  Pt presents w/moderate strength and ROM gains while also experiencing significant pain relief, though moderate pain still remains.  Pt improved from 10/10 pain at worst, to 5/10 pain at worst though her pain w/increased activity remains similar.  Pt MMT grades went up universally for the hip, ankle, and knee, though these gains were modest as pt admits to  being compliant w/HEP only 3-4 times/week. Pt's gains are consistent w/inconsistent performance of HEP as some strength gains have been made, but there is still room for improvement. Pt finds relief when recruiting lateral hip musculature during STS and when ambulating, but has been unable to do so consistently at this point. Pt can currently stand for over an hour w/out L knee pain and can walk for up to 40 minutes w/o pain on even surfaces. Pt remains challenged by uneven surfaces and stair negotiation. Plan is to progress LE strengthening while improving HEP consistency to promote mechanical change to pt's compound movements, gait, and other functional activities such as stair negotiation.   Understanding of Dx/Px/POC: good     Prognosis: good    Goals  Short Term Goals:  Target Date 4 weeks (12/11/24)  STG1. Initiate and advance HEP to maximize progress between therapy sessions.-met  STG2. Improve L hip strength to 4/5 to prevent L knee valgus during sit to stand transfer.-met  STG3. Reduce pain w/ WB activity including walking to <2/10 to improve community participation.-met    Long Term Goals:  Target Date 12 weeks (2/5/25)  LTG1. Pt to be Indep with HEP  to maximize progress between therapy sessions and improve functional mobility.-ongoing  LTG2. Pt to tolerate prolonged walking on uneven terrain for 20+ minutes to return to work at her office.-ongoing  LTG3. Pt to demo L hip strength of 4+/5 or better such that pt can perform reciprocal stair negotiation pain free.-ongoing       Plan  Patient would benefit from: skilled physical therapy  Planned modality interventions: thermotherapy: hydrocollator packs and cryotherapy    Planned therapy interventions: balance/weight bearing training, body mechanics training, functional ROM exercises, gait training, home exercise program, therapeutic activities, therapeutic exercise, stretching, strengthening, neuromuscular re-education and manual therapy    Frequency: 2x  "week  Duration in weeks: 12  Plan of Care beginning date: 11/13/2024  Plan of Care expiration date: 2/5/2025      Subjective Evaluation    History of Present Illness  Date of onset: 10/11/2024  Mechanism of injury: Pt reports to therapy rating her L knee pain as a 3-4/10 currently. Pt has had L knee pain \"on and off\" for a few years. This pain typically presents slightly distal to the patella, but can also present medially. Pt does not recall a particular motion that brings about this pain more than others, but adds that walking and stair climbing has been her more consistent sources of pain to date. Pt is currently working from home, but typically commutes to [facility] for work. She has to walk 40+minutes for work daily when in the city, and states that she stopped doing so because her knee pain grew too severe. Pt received a Cortizone shot to the L knee on 10/11 which created \"excruciating\" pain at first, but began to reduce pain 48 hours after admission. Following this injection she notes that she had two weeks of \"0/10\" pain. She returned to [place] for work because of this, but walking to work brought about her 4/10 pain. This is what caused the patient to look further into her knee pain and eventually attend PT.     12/9/24:  Pt reports to therapy stating that her current pain rating in the L knee is rated as a 0/10, but adds that at her worst it can reach 4-5/10. This elevated pain rating typically occurs following an accumulation of activity. It presents distal to the L patella, migrating medially when exacerbated. Pt can currently stand for an hour at a time w/o pain, as well as ambulate on flat ground. Uneven terrains and hills are currently the most challenging for her and the only time when she experiences acute pain. R knee has recently began to cause her significant pain rated as a 7-8/10 w/ clicking and locking per pt. She adds that this pain has been worsening over the past 2-3 weeks.           Recurrent " probem    Quality of life: good    Patient Goals  Patient goals for therapy: increased strength, independence with ADLs/IADLs, return to work, increased motion, improved balance and decreased pain  Patient goal: [place] shopping w/the family  Pain  Current pain ratin  At best pain ratin  At worst pain ratin  Location: Distal to L patella, medial L knee.  Quality: dull ache, pressure, sharp and knife-like  Relieving factors: rest and change in position  Aggravating factors: walking, stair climbing and standing  Progression: worsening    Social Support  Steps to enter house: yes  4  Stairs in house: yes   16  Lives in: multiple-level home  Lives with: spouse    Employment status: working  Hand dominance: right    Treatments  Previous treatment: injection treatment and medication (Meloxicam)      Objective    (*=pain)      Knee AROM: Degrees      R  L  R (24) L (24)  Extension(Quad set):  0  0  0  0  Extension Lag (SLR):   -1  -3  0  -1  Flexion: (Supine/Prone) 122  117  125  115      Strength: MMT  Hip    R  L  R (24) L (24)  IR(Seated/Supine)  4/5*  4+/5*  4+/5  4+/5  ER(Seated/Supine)  4+/5  4-/5  4+/5  4+/5  Flexion(Seated/Supine) 4/5  4-/5*  4+/5  4/5*  Abduction(S/L)  4-/5*  4-/5*  4/5  4/5  Abduction GM bias  3+/5*  3+/5*  4-/5  4-/5    Knee    R  L  R (24) L (24)  Extension(Seated)  4/5*  4-/5*  4+/5  4+/5    Flexion(Seated)  5/5  5/5  5/5  5/5    Ankle    R  L  R (24) L (24)  Dorsiflexion(Seated)  4+/5*  4-/5  5/5  5/5  Plantarflexion(Seated) 5/5  5/5  5/5  5/5    L/S Mobility: ()    Flexion: Min loss  Ext: No loss  Side bend L: Min loss w/pain  Side bend R: No loss    Mechanical Assessment L/S: ()    Pre-test symptoms include: 4/10 pain to L knee when walking  Repeated Extension in Standing (ANYI): 10x, no change, 10x no change  Repeated Extension in Lying (REIL):  10x, no change    Observation: ()  Pt has mild-mod clicking in L knee upon  "rising from chair in addition to L knee valgus.    (12/9/24): Pt has reduced L knee valgus and objectively less \"clicking\"     Tenderness/Palpation: (11/13)  Pt has moderate TTP to mid joint line of L knee    (12/9/24): No TTP to mid joint line of L knee    Special Tests:    Valgus Stress: (-)  Varus Stress: (+)  McMurrary's IR of the tibia + Varus stress = lateral meniscus: (-)  McMurrays ER of the tibia + Valgus stress = medial meniscus: (+)  Thessaly's: (+) on Right  Lachman: (-)    Function:   Squatting: Pt demonstrates L knee valgus upon rising from a chair  Ambulation: Pt demonstrates mild L lean during stance phase on L    (12/9/24):  Squatting: Pt demonstrates reduced L knee valgus during STS, though continues to be anteriorly loaded/knee dominant w/this movement janae. During descent/eccentric portion.  Ambulation: Pt no longer presents w/ L lean during L stance phase.          Precautions: No past medical history on file.     SOC: 11/13/24  FOTO: 11/22/24  POC Expiration: 2/5/25  Last RE: 12/9/2024  Daily Treatment Log:  Date 11/25/24 11/27/24 12/2/2024 12/4/2024 12/9/2024   Visit # 6 7 8 9 10;RE   Auth         Auth exp        Manual    5'    KT medial \"C\" L knee         L knee distraction w/band SJ SJ      R PF tape for lateral tilt correction        There Exer 10' 5' 15' 15' 35'   Objective Measures     SJ   Squat/STS    STS low mat 1x10 22\" height - mild L knee pain STS low mat 1x10 22\" height   TRX squat 10x w/GTB at ankles; 10x w/GTB and 1sec pause at bottom 10x w/GTB at ankles; 10x w/GTB and 1sec pause at bottom Squat w/ counter support 2x10 YTB ankles     Hooklying pullover        Hooklying clam shells  U/L w/RTB; 2x10 R/L  2x10 R/L RTB     Glute bridges   1x10 RTB abd 5\"x10; 2x w/ grn TB    SLR flex w/ quad set        Stand march   2x10 R/L w/ counter support                     HEP    Update & review    There Activ 25' 15' 15' 10' 10'   Upright bike Recumbent; L2x5'   L2x5'  L2X5'    Side stepping " "15', 3 laps, GTB at ankles Zig-Zag stepping w/GTB at ankles;15'x4 laps GTB at ankles;15'x4 laps  GTB at ankles;15'x4 laps   Monster walks including retro walks W/KAILEE; 12.5#, 5 laps W/KAILEE; 12.5#, 5 laps At rail GTB prox knee 12'x3 fwd/bkw   Open space RTB @ ankles 15'x3 ea fwd/bkwd    Step Ups/Downs 8\",  Step ups w/PT ABduction resistance via YTB 8\" step-up 1 x10 R/L near rail w/ CS 8\" step 1x10 R w/ PF tape no c/o 8\" step 1x10 R    Lateral Step ups/downs 10x R/L                       NMReed 10' 25' 15' 15'    Side lying hip abduction; GM bias  2x10 w/1# in standing at counter 2x10 w/1# in standing at counter     B/L SAQ w/ ball sq @ ankles    5\"x10; 2x    Standing TKE w/ ball vs wall    5\"x10 ea R/L    RDL w/SPC  2x10w/5# ankle weight on SPC 2x10 w/5# ankle weight on SPC VC for form 2x10 w/ 5# wgt on SPC    Knee ext. W/add 2x10 w1#; min-mod pain to medial R knee, relieved by distraction 2x10 w2#; min-mod pain to medial R knee, relieved by distraction      HL cane pullover        Palloff Press w/band around ankles W/KAILEE; 7.5# 2x10 R/L W/BTB; 2x10, YTB at ankles [place] 6# 2x10 YTB at ankles     SL         Modalities                                HEP:  Access Code: [email or web address]  URL: [email or web address]  Date: 12/04/2024  Prepared by: Vicki Green    Exercises  - Sidelying Hip Abduction  - 1-2 x daily - 7 x weekly - 2-3 sets - 10 reps  - Side Stepping with Resistance at Ankles  - 1-2 x daily - 7 x weekly - 2-3 sets - 10 reps  - Forward Monster Walk with Resistance (BKA)  - 1-2 x daily - 7 x weekly - 2-3 sets - 10 reps  - Sit to Stand  - 1-2 x daily - 7 x weekly - 2-3 sets - 10 reps  - Short Arc Quad with Ball Squeeze  - 1 x daily - 7 x weekly - 2 sets - 10 reps - 5 hold  - Supine Bridge with Resistance Band  - 1 x daily - 7 x weekly - 2 sets - 10 reps - 5 hold          "

## 2024-12-11 ENCOUNTER — OFFICE VISIT (OUTPATIENT)
Dept: PHYSICAL THERAPY | Facility: CLINIC | Age: 59
End: 2024-12-11
Payer: COMMERCIAL

## 2024-12-11 DIAGNOSIS — M17.12 PRIMARY OSTEOARTHRITIS OF LEFT KNEE: Primary | ICD-10-CM

## 2024-12-11 PROCEDURE — 97112 NEUROMUSCULAR REEDUCATION: CPT

## 2024-12-11 PROCEDURE — 97110 THERAPEUTIC EXERCISES: CPT

## 2024-12-11 PROCEDURE — 97530 THERAPEUTIC ACTIVITIES: CPT

## 2024-12-11 NOTE — PROGRESS NOTES
"Daily Note     Today's date: 2024  Patient name: Karly Yost  : 1965  MRN: [id number]  Referring provider: Lucius Mcpherson DO  Dx:   Encounter Diagnosis     ICD-10-CM    1. Primary osteoarthritis of left knee  M17.12                      Subjective: Pt reports to therapy citing 1/10 L knee pain following a day of walking in [place] for work; pt estimates 1.5 hours of combined walking. The L knee peaked at a 2/10 following this increased walking. However, pt states she felt 9/10 pain in the R knee following this day. She is going to reach out for her DO for a potential evaluation of her R knee.       Objective: See treatment diary below      Assessment: Tolerated treatment well. Pt spent time focusing on lateral hip musculature during today's session including an increase of resistance w/ sidelying hip abductions. Pt had a pre-intervention R knee pain rating of 4/10 during squats w/SOS UE support. This pain reduced to 1/10 following the addition of YTB around knees. Pt had no complaints of L knee pain t/o session. Patient would benefit from continued PT.      Plan: Continue per plan of care.      Precautions: No past medical history on file.     SOC: 24  FOTO: 24  POC Expiration: 25  Last RE: 2024  Daily Treatment Log:  Date 24   Visit #  7 8 9 10;RE   Auth         Auth exp        Manual    5'    KT medial \"C\" L knee         L knee distraction w/band  SJ      R PF tape for lateral tilt correction        There Exer 15' 5' 15' 15' 35'   Objective Measures     SJ   Squat/STS    STS low mat 1x10 22\" height - mild L knee pain STS low mat 1x10 22\" height   TRX squat 2x10 w/SOS, tapping chair  10x w/GTB at ankles; 10x w/GTB and 1sec pause at bottom Squat w/ counter support 2x10 YTB ankles     Hooklying pullover        Hooklying clam shells  Unilateral, RTB; 2x10  2x10 R/L RTB     Glute bridges 2x10 w/GTB at knees; VCs for PPT  1x10 RTB abd 5\"x10; 2x w/ " "grn TB    SLR flex w/ quad set        Stand march   2x10 R/L w/ counter support                     HEP    Update & review    There Activ 10' 15' 15' 10' 10'   Upright bike   L2x5'  L2X5'    Side stepping Open space RTB @ ankles 15'x3 ea fwd/bkwd Zig-Zag stepping w/GTB at ankles;15'x4 laps GTB at ankles;15'x4 laps  GTB at ankles;15'x4 laps   Monster walks including retro walks Open space RTB @ ankles 15'x3 ea fwd/bkwd W/HARJIT; 12.5#, 5 laps At rail GTB prox knee 12'x3 fwd/bkw   Open space RTB @ ankles 15'x3 ea fwd/bkwd    Step Ups/Downs  Step ups w/PT ABduction resistance via YTB 8\" step-up 1 x10 R/L near rail w/ CS 8\" step 1x10 R w/ PF tape no c/o 8\" step 1x10 R    Lateral Step ups/downs                        NMReed 15 25' 15' 15'    Side lying hip abduction; GM bias Sidelying, 2x10 R/L w/2# ankle weight 2x10 w/1# in standing at counter 2x10 w/1# in standing at counter     B/L SAQ w/ ball sq @ ankles    5\"x10; 2x    Standing TKE w/ ball vs wall 2x10; R/L    5\"x10 ea R/L    RDL w/SPC  2x10w/5# ankle weight on SPC 2x10 w/5# ankle weight on SPC VC for form 2x10 w/ 5# wgt on SPC    Knee ext. W/add  2x10 w2#; min-mod pain to medial R knee, relieved by distraction      HL cane pullover        Palloff Press w/band around ankles W/HARJIT and RTB at ankles; 7.5#, 2x10 R/L W/BTB; 2x10, YTB at ankles [place] 6# 2x10 YTB at ankles     SL         Modalities                                HEP:  Access Code: [email or web address]  URL: [email or web address]  Date: 12/04/2024  Prepared by: Vicki Green    Exercises  - Sidelying Hip Abduction  - 1-2 x daily - 7 x weekly - 2-3 sets - 10 reps  - Side Stepping with Resistance at Ankles  - 1-2 x daily - 7 x weekly - 2-3 sets - 10 reps  - Forward Monster Walk with Resistance (BKA)  - 1-2 x daily - 7 x weekly - 2-3 sets - 10 reps  - Sit to Stand  - 1-2 x daily - 7 x weekly - 2-3 sets - 10 reps  - Short Arc Quad with Ball Squeeze  - 1 x daily - 7 x weekly - 2 sets - 10 reps - 5 hold  - " Supine Bridge with Resistance Band  - 1 x daily - 7 x weekly - 2 sets - 10 reps - 5 hold

## 2024-12-18 ENCOUNTER — OFFICE VISIT (OUTPATIENT)
Dept: PHYSICAL THERAPY | Facility: CLINIC | Age: 59
End: 2024-12-18
Payer: COMMERCIAL

## 2024-12-18 DIAGNOSIS — M17.12 PRIMARY OSTEOARTHRITIS OF LEFT KNEE: Primary | ICD-10-CM

## 2024-12-18 PROCEDURE — 97110 THERAPEUTIC EXERCISES: CPT

## 2024-12-18 PROCEDURE — 97140 MANUAL THERAPY 1/> REGIONS: CPT

## 2024-12-18 PROCEDURE — 97112 NEUROMUSCULAR REEDUCATION: CPT

## 2024-12-18 NOTE — PROGRESS NOTES
"Daily Note     Today's date: 2024  Patient name: Karly Yost  : 1965  MRN: [id number]  Referring provider: Lucius Mcpherson DO  Dx:   Encounter Diagnosis     ICD-10-CM    1. Primary osteoarthritis of left knee  M17.12                      Subjective: Pt reports to therapy following 8 hours of walking in [employer] w/minimal rest. Pt reports elevated pain levels to 9/10 after 2 hours of walking. Today pt reports a pain level of 7/10 though it was a 3/10 this a.m. and increased linearly w/her activity t/o the day.       Objective: See treatment diary below      Assessment: Tolerated treatment well. Pt experienced increased L knee pain w/sidelying hip abduction w/2#. This was modified to sidelying w/GTB and pt completed w/o pain. Pt additionally had increased R knee pain stemming from her day walking in [employer], rated today as a 4/10. Intensity/resistance of interventions was decreased today due to b/l knee pain upon entering today's session.  Patient would benefit from continued PT      Plan: Continue per plan of care.      Precautions: No past medical history on file.     SOC: 24  FOTO: 24  POC Expiration: 25  Last RE: 2024  Daily Treatment Log:  Date 24   Visit # 11 12 8 9 10;RE   Auth         Auth exp        Manual  10'  5'    KT medial \"C\" L knee         L knee distraction w/band  SJ      Edema Massage  SJ      R PF tape for lateral tilt correction        There Exer 15' 10' 15' 15' 35'   Objective Measures     SJ   Squat/STS  10x to 17 inch chair, BW  STS low mat 1x10 22\" height - mild L knee pain STS low mat 1x10 22\" height   TRX squat 2x10 w/SOS, tapping chair   Squat w/ counter support 2x10 YTB ankles     Hooklying pullover        Hooklying clam shells  Unilateral, RTB; 2x10 Sidelying, GTB, 2x10 w/3s hold 2x10 R/L RTB     Glute bridges 2x10 w/GTB at knees; VCs for PPT 3x10 w/BTB at knees; VCs for PPT 1x10 RTB abd 5\"x10; 2x w/ grn " No answer MAZIN full     Needs change to VV or reschedule "TB    SLR flex w/ quad set        Stand march   2x10 R/L w/ counter support                     HEP    Update & review    There Activ 10' 10' 15' 10' 10'   Upright bike   L2x5'  L2X5'    Side stepping Open space RTB @ ankles 15'x3 ea fwd/bkwd On foam balance beam, 5 laps at table GTB at ankles;15'x4 laps  GTB at ankles;15'x4 laps   Monster walks including retro walks Open space RTB @ ankles 15'x3 ea fwd/bkwd No resistance, 3 laps; 3 laps w/15# on HARJIT At rail GTB prox knee 12'x3 fwd/bkw   Open space RTB @ ankles 15'x3 ea fwd/bkwd    Step Ups/Downs   8\" step-up 1 x10 R/L near rail w/ CS 8\" step 1x10 R w/ PF tape no c/o 8\" step 1x10 R    Lateral Step ups/downs                        NMReed 15 10' 15' 15'    Side lying hip abduction; GM bias Sidelying, 2x10 R/L w/2# ankle weight W/bent knees and GTB, 2x10 R/L 2x10 w/1# in standing at counter     B/L SAQ w/ ball sq @ ankles    5\"x10; 2x    Standing TKE w/ ball vs wall 2x10; R/L    5\"x10 ea R/L    RDL w/SPC   2x10 w/5# ankle weight on SPC VC for form 2x10 w/ 5# wgt on SPC    Knee ext. W/add        HL cane pullover        Palloff Press w/band around ankles W/HARJIT and RTB at ankles; 7.5#, 2x10 R/L W/HARJIT and RTB at ankles; 7.5#, 2x10 R/L [place] 6# 2x10 YTB at ankles     SL         Modalities                                HEP:  Access Code: [email or web address]  URL: [email or web address]  Date: 12/04/2024  Prepared by: Vicki Green    Exercises  - Sidelying Hip Abduction  - 1-2 x daily - 7 x weekly - 2-3 sets - 10 reps  - Side Stepping with Resistance at Ankles  - 1-2 x daily - 7 x weekly - 2-3 sets - 10 reps  - Forward Monster Walk with Resistance (BKA)  - 1-2 x daily - 7 x weekly - 2-3 sets - 10 reps  - Sit to Stand  - 1-2 x daily - 7 x weekly - 2-3 sets - 10 reps  - Short Arc Quad with Ball Squeeze  - 1 x daily - 7 x weekly - 2 sets - 10 reps - 5 hold  - Supine Bridge with Resistance Band  - 1 x daily - 7 x weekly - 2 sets - 10 reps - 5 hold            "

## 2024-12-20 ENCOUNTER — APPOINTMENT (OUTPATIENT)
Dept: PHYSICAL THERAPY | Facility: CLINIC | Age: 59
End: 2024-12-20
Payer: COMMERCIAL

## 2024-12-20 ENCOUNTER — TELEPHONE (OUTPATIENT)
Dept: PHYSICAL THERAPY | Facility: CLINIC | Age: 59
End: 2024-12-20

## 2024-12-20 NOTE — TELEPHONE ENCOUNTER
"just called and cancelled this mornings appt - \"too much going on\" - rescheduled and scheduled out a few weeks   "

## 2024-12-26 ENCOUNTER — APPOINTMENT (OUTPATIENT)
Dept: PHYSICAL THERAPY | Facility: CLINIC | Age: 59
End: 2024-12-26
Payer: COMMERCIAL

## 2024-12-27 ENCOUNTER — OFFICE VISIT (OUTPATIENT)
Dept: PHYSICAL THERAPY | Facility: CLINIC | Age: 59
End: 2024-12-27
Payer: COMMERCIAL

## 2024-12-27 DIAGNOSIS — M17.12 PRIMARY OSTEOARTHRITIS OF LEFT KNEE: Primary | ICD-10-CM

## 2024-12-27 PROCEDURE — 97530 THERAPEUTIC ACTIVITIES: CPT

## 2024-12-27 PROCEDURE — 97110 THERAPEUTIC EXERCISES: CPT

## 2024-12-27 PROCEDURE — 97112 NEUROMUSCULAR REEDUCATION: CPT

## 2024-12-27 NOTE — PROGRESS NOTES
"Daily Note     Today's date: 2024  Patient name: Karly Yost  : 1965  MRN: [id number]  Referring provider: Lucius Mcpherson DO  Dx:   Encounter Diagnosis     ICD-10-CM    1. Primary osteoarthritis of left knee  M17.12                      Subjective: Pt states that her L knee isn't too bad. States that it has been feeling pretty good. She has not had much time to complete HEP; states that when she did have time her knee was \"bugging\" her so she thought she'd hold off. States that R knee hasn't been bothering her today. Pt states that medial L knee pain is present with prolonged standing (1 hour) and walking. L knee pain is minimal and rated as 0/10 pain on arrival.     Objective: See treatment diary below      Assessment: Pt with medial L knee pain with STS with low tx table; raised table to limit depth which improved tolerance to activity. Pain at medial L knee with monster walk, therefore held additional repetitions. Pt continues to be appropriately challenged by table exercises including SL hip abduction with knee flexed; provided max cuing to prevent compensatory hip flexion w/ this exercise. Plan to introduce active hip flexor stretching in upcoming session. Pt instructed to trial HEP even on days that knee is not entirely pain-free and implement modification as need and assess response; instructed to discontinue if pain is severe or worsens. Tolerated treatment well.  Patient would benefit from continued PT.       Plan: Continue per plan of care.      Precautions: No past medical history on file.     SOC: 24  FOTO: 24  POC Expiration: 25  Last RE: 2024  Daily Treatment Log:  Date 24 Next session 2024   Visit # 11 12 13    10;RE   Auth         Auth exp        Manual  10'      KT medial \"C\" L knee         L knee distraction w/band  SJ      Edema Massage  SJ      R PF tape for lateral tilt correction        There Exer 15' 10' 20'  35'   Objective " "Measures     SJ   Squat/STS  10x to 17 inch chair, BW x5 reps at 20\" tx table - painful    X5 reps at 22\" tx table    1x10 at 22\" tx table  STS low mat 1x10 22\" height   TRX squat 2x10 w/SOS, tapping chair        Hooklying pullover        Hooklying clam shells  Unilateral, RTB; 2x10 Sidelying, GTB, 2x10 w/3s hold Sidelying, GTB, 2x10 w/3s hold     Glute bridges 2x10 w/GTB at knees; VCs for PPT 3x10 w/BTB at knees; VCs for PPT 3x10 w/BTB at knees     SLR flex w/ quad set        Stand march                        HEP        There Activ 10' 10' 10'  10'   Upright bike        Side stepping Open space RTB @ ankles 15'x3 ea fwd/bkwd On foam balance beam, 5 laps at table On foam balance beam 6' x 3 laps R/L at counter w/ CS, 2x  GTB at ankles;15'x4 laps   Monster walks including retro walks Open space RTB @ ankles 15'x3 ea fwd/bkwd No resistance, 3 laps; 3 laps w/15# on HARJIT Open space RTB @ ankles 16'x1 ea fwd/bkwd    3/10 L medial knee pain w/ attempting additional lap; instructed to hold; pain subsides to 1/10 w/ stopping activity     Step Ups/Downs     8\" step 1x10 R    Lateral Step ups/downs                        NMReed 15 10' 10'     Side lying hip abduction; GM bias Sidelying, 2x10 R/L w/2# ankle weight W/bent knees and GTB, 2x10 R/L W/bent knees and GTB, 2x10 R/L VC for form     B/L SAQ w/ ball sq @ ankles        Standing TKE w/ ball vs wall 2x10; R/L        RDL w/SPC        Knee ext. W/add        HL cane pullover        Palloff Press w/band around ankles W/HARJIT and RTB at ankles; 7.5#, 2x10 R/L W/HARJIT and RTB at ankles; 7.5#, 2x10 R/L W/HARJIT and RTB at ankles; 7.5#, 2x10 R/L     SL         Modalities                                HEP:  Access Code: [email or web address]  URL: [email or web address]  Date: 12/04/2024  Prepared by: Vicki Green    Exercises  - Sidelying Hip Abduction  - 1-2 x daily - 7 x weekly - 2-3 sets - 10 reps  - Side Stepping with Resistance at Ankles  - 1-2 x daily - 7 x weekly - 2-3 " sets - 10 reps  - Forward Monster Walk with Resistance (BKA)  - 1-2 x daily - 7 x weekly - 2-3 sets - 10 reps  - Sit to Stand  - 1-2 x daily - 7 x weekly - 2-3 sets - 10 reps  - Short Arc Quad with Ball Squeeze  - 1 x daily - 7 x weekly - 2 sets - 10 reps - 5 hold  - Supine Bridge with Resistance Band  - 1 x daily - 7 x weekly - 2 sets - 10 reps - 5 hold

## 2025-01-02 ENCOUNTER — OFFICE VISIT (OUTPATIENT)
Dept: PHYSICAL THERAPY | Facility: CLINIC | Age: 60
End: 2025-01-02
Payer: COMMERCIAL

## 2025-01-02 DIAGNOSIS — M17.12 PRIMARY OSTEOARTHRITIS OF LEFT KNEE: Primary | ICD-10-CM

## 2025-01-02 PROCEDURE — 97112 NEUROMUSCULAR REEDUCATION: CPT

## 2025-01-02 PROCEDURE — 97110 THERAPEUTIC EXERCISES: CPT

## 2025-01-02 NOTE — PROGRESS NOTES
"Daily Note     Today's date: 2025  Patient name: Karly Yost  : 1965  MRN: [id number]  Referring provider: Lucius Mcpherson DO  Dx:   Encounter Diagnosis     ICD-10-CM    1. Primary osteoarthritis of left knee  M17.12                        Subjective: Pt states that she has no knee pain on arrival to session. States that she does have bilateral knee pain/ discomfort with stairs (walking down stairs most specifically). When she does have pain it is about medial knee and just distal to patella.       Objective: See treatment diary below      Assessment: Pt with L medial and distal/anterior knee discomfort following right side-lying clamshell; held on additional set. Pt continues to requires VC to prevent compensatory hip flexor activation w/ side-lying hip abduction as she demonstrates tendency to bring knee anterior to shoulders with lifting LE. Introduced patient to backward walk w/ knee hike for goal of challenging SL stability and quad strength. Added seated hamstring curl vs light resistance and DKTC on pball for ROM due to remaining knee flexion AROM limitation on most recent re-evaluation. Assess response to TE next session and modify program as needed. Tolerated treatment well. Patient would benefit from continued PT.       Plan: Continue per plan of care.      Precautions: No past medical history on file.     SOC: 24  FOTO: 24  POC Expiration: 25  Last RE: 2024  Daily Treatment Log:  Date 24 Next session   Visit # 11 12 13   14    Auth         Auth exp        Manual  10'      KT medial \"C\" L knee         L knee distraction w/band  SJ      Edema Massage  SJ      R PF tape for lateral tilt correction        There Exer 15' 10' 20' 10'    Objective Measures        Squat/STS  10x to 17 inch chair, BW x5 reps at 20\" tx table - painful    X5 reps at 22\" tx table    1x10 at 22\" tx table 1x10 at 22\" tx table, 2x    TRX squat 2x10 w/SOS, tapping chair     " "   Hooklying pullover        Hooklying clam shells  Unilateral, RTB; 2x10 Sidelying, GTB, 2x10 w/3s hold Sidelying, GTB, 2x10 w/3s hold Sidelying, GTB, 1x12 w/3s hold    Glute bridges 2x10 w/GTB at knees; VCs for PPT 3x10 w/BTB at knees; VCs for PPT 3x10 w/BTB at knees 3x12 w/ BTB at knees     SLR flex w/ quad set        Stand march                        HEP        There Activ 10' 10' 10' 10'    Upright bike        Side stepping Open space RTB @ ankles 15'x3 ea fwd/bkwd On foam balance beam, 5 laps at table On foam balance beam 6' x 3 laps R/L at counter w/ CS, 2x On foam balance beam 6' x 4 laps R/L at counter w/ CS and intermittent UE support, 2x    Monster walks including retro walks Open space RTB @ ankles 15'x3 ea fwd/bkwd No resistance, 3 laps; 3 laps w/15# on HARJIT Open space RTB @ ankles 16'x1 ea fwd/bkwd    3/10 L medial knee pain w/ attempting additional lap; instructed to hold; pain subsides to 1/10 w/ stopping activity Open space GTB @ knees 16'x1 ea fwd/bkwd, 2x    DKTC w/ pball    1x10 B/L    Step Ups/Downs        Lateral Step ups/downs                        NMReed 15 10' 10' 25'    Side lying hip abduction; GM bias Sidelying, 2x10 R/L w/2# ankle weight W/bent knees and GTB, 2x10 R/L W/bent knees and GTB, 2x10 R/L VC for form W/bent knees and GTB, 2x10 R/L VC for form    B/L SAQ w/ ball sq @ ankles        Standing TKE w/ ball vs wall 2x10; R/L    RTB w/ uni rail 3\"x8 R/L, mild discomfort R knee W/ caution   RDL w/SPC    1x10 B/L GTB at knee w/ single 5# DB, 2x    Knee ext. W/add        HL cane pullover        Palloff Press w/band around ankles W/HARJIT and RTB at ankles; 7.5#, 2x10 R/L W/HARJIT and RTB at ankles; 7.5#, 2x10 R/L W/HARJIT and RTB at ankles; 7.5#, 2x10 R/L W/HARJIT and RTB at ankles; 7.5#, 2x10 R/L    Backward walk w/ knee hike    10' x 1 R/L w/ rail    Seated HS curl    1x10 YTB R/L    SL         Modalities                                HEP:  Access Code: [email or web address]  URL: " [email or web address]  Date: 12/04/2024  Prepared by: Vicki Green    Exercises  - Sidelying Hip Abduction  - 1-2 x daily - 7 x weekly - 2-3 sets - 10 reps  - Side Stepping with Resistance at Ankles  - 1-2 x daily - 7 x weekly - 2-3 sets - 10 reps  - Forward Monster Walk with Resistance (BKA)  - 1-2 x daily - 7 x weekly - 2-3 sets - 10 reps  - Sit to Stand  - 1-2 x daily - 7 x weekly - 2-3 sets - 10 reps  - Short Arc Quad with Ball Squeeze  - 1 x daily - 7 x weekly - 2 sets - 10 reps - 5 hold  - Supine Bridge with Resistance Band  - 1 x daily - 7 x weekly - 2 sets - 10 reps - 5 hold

## 2025-01-06 ENCOUNTER — OFFICE VISIT (OUTPATIENT)
Dept: PHYSICAL THERAPY | Facility: CLINIC | Age: 60
End: 2025-01-06
Payer: COMMERCIAL

## 2025-01-06 DIAGNOSIS — M17.12 PRIMARY OSTEOARTHRITIS OF LEFT KNEE: Primary | ICD-10-CM

## 2025-01-06 PROCEDURE — 97110 THERAPEUTIC EXERCISES: CPT

## 2025-01-06 PROCEDURE — 97112 NEUROMUSCULAR REEDUCATION: CPT

## 2025-01-06 PROCEDURE — 97530 THERAPEUTIC ACTIVITIES: CPT

## 2025-01-06 NOTE — PROGRESS NOTES
"Daily Note     Today's date: 2025  Patient name: Karly Yost  : 1965  MRN: [id number]  Referring provider: Lucius Mcpherson DO  Dx:   Encounter Diagnosis     ICD-10-CM    1. Primary osteoarthritis of left knee  M17.12                      Subjective: Pt reports to therapy citing 0/10 pain in her L knee and adds that 2/10 is the highest level of pain she has experienced in between sessions.       Objective: See treatment diary below      Assessment: Tolerated treatment well. Pt has two days in a row ( & ) of commuting to and ambulating in [facility] for work. This ids typically a large source of pain for pt, so today's session focused more on ROM, and core/hip stability as opposed to loading the quad. Patient would benefit from continued PT to increase LE stability and decrease L knee pain. HEP updated and edited to \"A\" and \"B\" days to make completion of HEP more accessible/digestible for pt.       Plan: Continue per plan of care.      Precautions: No past medical history on file.     SOC: 24  FOTO: 24  POC Expiration: 25  Last RE: 2024  Daily Treatment Log:  Date 24   Visit # 11 12 13   14 15   Auth         Auth exp        Manual  10'      KT medial \"C\" L knee         L knee distraction w/band  SJ      Edema Massage  SJ      R PF tape for lateral tilt correction        There Exer 15' 10' 20' 10' 10'   Objective Measures        Squat/STS  10x to 17 inch chair, BW x5 reps at 20\" tx table - painful    X5 reps at 22\" tx table    1x10 at 22\" tx table 1x10 at 22\" tx table, 2x 3x12 w/YTB at knees. 22in table height   TRX squat 2x10 w/SOS, tapping chair        Hooklying pullover        Hooklying clam shells  Unilateral, RTB; 2x10 Sidelying, GTB, 2x10 w/3s hold Sidelying, GTB, 2x10 w/3s hold Sidelying, GTB, 1x12 w/3s hold    Glute bridges 2x10 w/GTB at knees; VCs for PPT 3x10 w/BTB at knees; VCs for PPT 3x10 w/BTB at knees 3x12 w/ BTB at knees  3x12-15 w/ " "BTB at knees    SLR flex w/ quad set        Stand march                        HEP        There Activ 10' 10' 10' 10' 15'   Upright bike        Side stepping Open space RTB @ ankles 15'x3 ea fwd/bkwd On foam balance beam, 5 laps at table On foam balance beam 6' x 3 laps R/L at counter w/ CS, 2x On foam balance beam 6' x 4 laps R/L at counter w/ CS and intermittent UE support, 2x On foam balance beam 6' x 4 laps R/L at counter w/ CS and intermittent UE support, 4x w/ 2# ankle weights   Monster walks including retro walks Open space RTB @ ankles 15'x3 ea fwd/bkwd No resistance, 3 laps; 3 laps w/15# on HARJIT Open space RTB @ ankles 16'x1 ea fwd/bkwd    3/10 L medial knee pain w/ attempting additional lap; instructed to hold; pain subsides to 1/10 w/ stopping activity Open space GTB @ knees 16'x1 ea fwd/bkwd, 2x Open space GTB @ knees 16'x1 ea fwd/bkwd, 3x   DKTC w/ pball    1x10 B/L 1x10 B/L   Step Ups/Downs        Lateral Step ups/downs        Pt education     Pt educated on importance of performing HEP consistently after admittance to poor adherence at home. HEP updated to reflect today's changes.           NMReed 15 10' 10' 25' 20'   Side lying hip abduction; GM bias Sidelying, 2x10 R/L w/2# ankle weight W/bent knees and GTB, 2x10 R/L W/bent knees and GTB, 2x10 R/L VC for form W/bent knees and GTB, 2x10 R/L VC for form In standing, 3# ankle weights and VCs for core engagement   B/L SAQ w/ ball sq @ ankles        Standing TKE w/ ball vs wall 2x10; R/L    RTB w/ uni rail 3\"x8 R/L, mild discomfort R knee    RDL w/SPC    1x10 B/L GTB at knee w/ single 5# DB, 2x 1x10 B/L GTB at knee w/ single 7# ankle weight on SPC, 3x   Knee ext. W/add        HL cane pullover        Palloff Press w/band around ankles W/HARJIT and RTB at ankles; 7.5#, 2x10 R/L W/HARJIT and RTB at ankles; 7.5#, 2x10 R/L W/HARJIT and RTB at ankles; 7.5#, 2x10 R/L W/HARJIT and RTB at ankles; 7.5#, 2x10 R/L W/HARJIT and RTB at ankles; 7.5#, 2x10 R/L "   Backward walk w/ knee hike    10' x 1 R/L w/ rail 10' x 1 R/L w/ rail   Seated HS curl    1x10 YTB R/L    SL         Modalities                                HEP:  Access Code: [email or web address]  URL: [email or web address]  Date: 01/06/2025  Prepared by: Fransisco Randall    Exercises  - Side Stepping with Resistance at Ankles  - 1-2 x daily - 7 x weekly - 1-3 sets - 10 reps  - Forward Monster Walk with Resistance (BKA)  - 1-2 x daily - 7 x weekly - 1-3 sets - 10 reps  - Supine Bridge with Resistance Band  - 1-2 x daily - 7 x weekly - 1-3 sets - 10 reps - 5 hold  - Standing Hip Abduction with Counter Support  - 1-2 x daily - 7 x weekly - 1-3 sets - 10 reps  - Sit to Stand with Resistance Around Legs  - 1-2 x daily - 7 x weekly - 1-3 sets - 10 reps  - Standing Anti-Rotation Press with Anchored Resistance  - 1-2 x daily - 7 x weekly - 1-3 sets - 10 reps

## 2025-01-09 ENCOUNTER — APPOINTMENT (OUTPATIENT)
Dept: PHYSICAL THERAPY | Facility: CLINIC | Age: 60
End: 2025-01-09
Payer: COMMERCIAL

## 2025-01-09 ENCOUNTER — TELEPHONE (OUTPATIENT)
Dept: PHYSICAL THERAPY | Facility: CLINIC | Age: 60
End: 2025-01-09

## 2025-01-10 ENCOUNTER — APPOINTMENT (OUTPATIENT)
Dept: PHYSICAL THERAPY | Facility: CLINIC | Age: 60
End: 2025-01-10
Payer: COMMERCIAL

## 2025-01-13 ENCOUNTER — OFFICE VISIT (OUTPATIENT)
Dept: PHYSICAL THERAPY | Facility: CLINIC | Age: 60
End: 2025-01-13
Payer: COMMERCIAL

## 2025-01-13 DIAGNOSIS — M17.12 PRIMARY OSTEOARTHRITIS OF LEFT KNEE: Primary | ICD-10-CM

## 2025-01-13 PROCEDURE — 97112 NEUROMUSCULAR REEDUCATION: CPT

## 2025-01-13 PROCEDURE — 97530 THERAPEUTIC ACTIVITIES: CPT

## 2025-01-13 PROCEDURE — 97110 THERAPEUTIC EXERCISES: CPT

## 2025-01-13 NOTE — PROGRESS NOTES
"Daily Note     Today's date: 2025  Patient name: Karly Yost  : 1965  MRN: [id number]  Referring provider: Lucius Mcpherson DO  Dx:   Encounter Diagnosis     ICD-10-CM    1. Primary osteoarthritis of left knee  M17.12                      Subjective: Pt reports to therapy citing low current pain levels \"1/10\" but adds that her two days in [facility] for work significantly increased her pain to about \"a 7/10\" at the end of the day. She maintains that stair negotiation remains her most challenging activity.       Objective: See treatment diary below      Assessment: Tolerated treatment well. She performed step up intervention to 6 in today w/o pain of the L knee. Pt is going to [facility] for work tomorrow and will attempt to perform HEP prior to work. Pt admits poor adherence to HEP and was educated on why consistency is  vital to her progression in PT. Patient would benefit from continued PT      Plan: Continue per plan of care.      Precautions: No past medical history on file.     SOC: 24  FOTO: 24  POC Expiration: 25  Last RE: 2024  Daily Treatment Log:  Date 24   Visit # 16 12 13   14 15   Auth         Auth exp        Manual  10'      KT medial \"C\" L knee         L knee distraction w/band  SJ      Edema Massage  SJ      R PF tape for lateral tilt correction        There Exer 10' 10' 20' 10' 10'   Objective Measures        Squat/STS  10x to 17 inch chair, BW x5 reps at 20\" tx table - painful    X5 reps at 22\" tx table    1x10 at 22\" tx table 1x10 at 22\" tx table, 2x 3x12 w/YTB at knees. 22in table height   TRX squat 2x10 to chair w/foam to decrease height       Hooklying pullover        Hooklying clam shells   Sidelying, GTB, 2x10 w/3s hold Sidelying, GTB, 2x10 w/3s hold Sidelying, GTB, 1x12 w/3s hold    Glute bridges 3x12-15 w/ BTB at knees  3x10 w/BTB at knees; VCs for PPT 3x10 w/BTB at knees 3x12 w/ BTB at knees  3x12-15 w/ BTB at knees    SLR flex w/ " "quad set        Stand march                        HEP        There Activ 15' 10' 10' 10' 15'   Upright bike 3 min       Side stepping W/RTB, 3 laps in clinic On foam balance beam, 5 laps at table On foam balance beam 6' x 3 laps R/L at counter w/ CS, 2x On foam balance beam 6' x 4 laps R/L at counter w/ CS and intermittent UE support, 2x On foam balance beam 6' x 4 laps R/L at counter w/ CS and intermittent UE support, 4x w/ 2# ankle weights   Monster walks including retro walks  No resistance, 3 laps; 3 laps w/15# on HARJIT Open space RTB @ ankles 16'x1 ea fwd/bkwd    3/10 L medial knee pain w/ attempting additional lap; instructed to hold; pain subsides to 1/10 w/ stopping activity Open space GTB @ knees 16'x1 ea fwd/bkwd, 2x Open space GTB @ knees 16'x1 ea fwd/bkwd, 3x   DKTC w/ pball 1x10 B/L   1x10 B/L 1x10 B/L   Step Ups/Downs 2x10 R/L       Lateral Step ups/downs        Pt education     Pt educated on importance of performing HEP consistently after admittance to poor adherence at home. HEP updated to reflect today's changes.           NMReed 15' 10' 10' 25' 20'   Side lying hip abduction; GM bias Side lying, 2x10 w/2# weight W/bent knees and GTB, 2x10 R/L W/bent knees and GTB, 2x10 R/L VC for form W/bent knees and GTB, 2x10 R/L VC for form In standing, 3# ankle weights and VCs for core engagement   B/L SAQ w/ ball sq @ ankles        Standing TKE w/ ball vs wall    RTB w/ uni rail 3\"x8 R/L, mild discomfort R knee    RDL w/SPC    1x10 B/L GTB at knee w/ single 5# DB, 2x 1x10 B/L GTB at knee w/ single 7# ankle weight on SPC, 3x   Knee ext. W/add 3x 10 w/3# on R/L       HL cane pullover        Palloff Press w/band around ankles  W/HARJIT and RTB at ankles; 7.5#, 2x10 R/L W/HARJIT and RTB at ankles; 7.5#, 2x10 R/L W/HARJIT and RTB at ankles; 7.5#, 2x10 R/L W/HARJIT and RTB at ankles; 7.5#, 2x10 R/L   Backward walk w/ knee hike    10' x 1 R/L w/ rail 10' x 1 R/L w/ rail   Seated HS curl 10x RTB, 3s hold   1x10 YTB " R/L    SL         Manual OP to L knee in extension 10x5s       Modalities                                HEP:  Access Code: [email or web address]  URL: [email or web address]  Date: 01/06/2025  Prepared by: Fransisco Randall    Exercises  - Side Stepping with Resistance at Ankles  - 1-2 x daily - 7 x weekly - 1-3 sets - 10 reps  - Forward Monster Walk with Resistance (BKA)  - 1-2 x daily - 7 x weekly - 1-3 sets - 10 reps  - Supine Bridge with Resistance Band  - 1-2 x daily - 7 x weekly - 1-3 sets - 10 reps - 5 hold  - Standing Hip Abduction with Counter Support  - 1-2 x daily - 7 x weekly - 1-3 sets - 10 reps  - Sit to Stand with Resistance Around Legs  - 1-2 x daily - 7 x weekly - 1-3 sets - 10 reps  - Standing Anti-Rotation Press with Anchored Resistance  - 1-2 x daily - 7 x weekly - 1-3 sets - 10 reps

## 2025-01-16 ENCOUNTER — TELEPHONE (OUTPATIENT)
Dept: PHYSICAL THERAPY | Facility: CLINIC | Age: 60
End: 2025-01-16

## 2025-01-16 ENCOUNTER — APPOINTMENT (OUTPATIENT)
Dept: PHYSICAL THERAPY | Facility: CLINIC | Age: 60
End: 2025-01-16
Payer: COMMERCIAL

## 2025-01-16 NOTE — TELEPHONE ENCOUNTER
LM for pt checking in if all was okay. Pt had 4:15 appointment time but did not show or call. Pt must schedule more appointments going forward.

## 2025-01-24 ENCOUNTER — EVALUATION (OUTPATIENT)
Dept: PHYSICAL THERAPY | Facility: CLINIC | Age: 60
End: 2025-01-24
Payer: COMMERCIAL

## 2025-01-24 DIAGNOSIS — M17.12 PRIMARY OSTEOARTHRITIS OF LEFT KNEE: Primary | ICD-10-CM

## 2025-01-24 PROCEDURE — 97110 THERAPEUTIC EXERCISES: CPT

## 2025-01-24 PROCEDURE — 97112 NEUROMUSCULAR REEDUCATION: CPT

## 2025-01-24 NOTE — PROGRESS NOTES
PT Evaluation     Today's date: 2025  Patient name: Karyl Yost  : 1965  MRN: [id number]  Referring provider: Lucius Mcpherson DO  Dx:   Encounter Diagnosis     ICD-10-CM    1. Primary osteoarthritis of left knee  M17.12                          Assessment  Impairments: abnormal or restricted ROM, abnormal movement, activity intolerance, impaired physical strength, pain with function, poor body mechanics and activity limitations  Symptom irritability: moderate    Assessment details: 25:  Pt reports that she has experienced decreased pain levels over the past 4 weeks, rating her highest level of pain as 3/10 while noting 2/10 is more typical. This pain presents medially to the L patella. Pt adds that 3/10 pain tends to present following a day of walking in the city for work. This is a significant improvement from previous RE's as pt was experiencing pain up to 6-7/10 w/this same activity. Pt was previously less consistent w/HEP performance prior to the past 2-3 weeks, and has seen these decreases in s/x since increasing her consistency. Pt also demonstrates full L knee ROM w/ no quad lag during SLR. Plan is to continue increasing the efficiency of LE mechanics through compound movements while increasing musculare endurance to allow for pain free walking in the city for work.   Understanding of Dx/Px/POC: good     Prognosis: good    Goals  Short Term Goals:  Target Date 4 weeks (24)  STG1. Initiate and advance HEP to maximize progress between therapy sessions.-met  STG2. Improve L hip strength to 4/5 to prevent L knee valgus during sit to stand transfer.-met  STG3. Reduce pain w/ WB activity including walking to <2/10 to improve community participation.-met    Long Term Goals:  Target Date 12 weeks (25)  LTG1. Pt to be Indep with HEP  to maximize progress between therapy sessions and improve functional mobility.-ongoing  LTG2. Pt to tolerate prolonged walking on uneven terrain for 20+ minutes  to return to work at her office.-ongoing  LTG3. Pt to demo L hip strength of 4+/5 or better such that pt can perform reciprocal stair negotiation pain free.-ongoing       Plan  Patient would benefit from: skilled physical therapy  Planned modality interventions: thermotherapy: hydrocollator packs and cryotherapy    Planned therapy interventions: balance/weight bearing training, body mechanics training, functional ROM exercises, gait training, home exercise program, therapeutic activities, therapeutic exercise, stretching, strengthening, neuromuscular re-education and manual therapy    Frequency: 2x week  Duration in weeks: 12  Plan of Care beginning date: 2024  Plan of Care expiration date: 2025    Subjective Evaluation    History of Present Illness  Date of onset: 10/11/2024  Mechanism of injury: 25:  Pt reports to therapy citing 0/10 current pain of the L knee. She rates her highest level of pain during the last week as a 3/10 following a day of walking in [place] for work. This pain presenting medial to the L patella when it appears. Aside from prolonged walking, stairs, squatting and rising from a lunged position cause the most amount of pain. Pt admits to increasing frequency of HEP performance over the past two weeks, which she believes has helped reduce pain in addition to more consistent walking in the city. Pt had previously not been walking in the city much in December, which led to an increase in pain upon returning. This has now calmed down as pt was reaching 6-7/10 pain w/this activity prior.           Recurrent probem    Quality of life: good    Patient Goals  Patient goals for therapy: increased strength, independence with ADLs/IADLs, return to work, increased motion, improved balance and decreased pain  Patient goal: [place] shopping w/the family  Pain  Current pain ratin  At best pain ratin  At worst pain rating: 3  Location: Distal to L patella, medial L knee.  Quality: dull  "ache, pressure, sharp and knife-like  Relieving factors: rest and change in position  Aggravating factors: walking, stair climbing and standing  Progression: worsening    Social Support  Steps to enter house: yes  4  Stairs in house: yes   16  Lives in: multiple-level home  Lives with: spouse    Employment status: working  Hand dominance: right    Treatments  Previous treatment: injection treatment and medication (Meloxicam)    Objective    (*=pain)      Knee AROM: Degrees      R  L  R (1/24/25) L (1/24/25)  Extension(Quad set):  0  0  0  0  Extension Lag (SLR):   -1  -3  0  0  Flexion: (Supine/Prone) 122  117  125  125      Strength: MMT  Hip    R  L  R (12/9/24) L (1/24/25)  IR(Seated/Supine)  4/5*  4+/5*  4+/5  4+/5  ER(Seated/Supine)  4+/5  4-/5  4+/5  4+/5  Flexion(Seated/Supine) 4/5  4-/5*  4+/5  4/5*  Abduction(S/L)  4-/5*  4-/5*  4/5  4/5  Abduction GM bias  3+/5*  3+/5*  4-/5  4/5    Knee    R  L  R (1/24/25) L (1/24/25)  Extension(Seated)  4/5*  4-/5*  5/5  4+/5*    Flexion(Seated)  5/5  5/5  5/5  5/5    Ankle    R  L  R (1/24/25) L (1/24/25)  Dorsiflexion(Seated)  4+/5*  4-/5  5/5  5/5  Plantarflexion(Seated) 5/5  5/5  5/5  5/5    L/S Mobility: (11/13)    Flexion: Min loss  Ext: No loss  Side bend L: Min loss w/pain  Side bend R: No loss    Mechanical Assessment L/S: (11/13)    Pre-test symptoms include: 4/10 pain to L knee when walking  Repeated Extension in Standing (ANYI): 10x, no change, 10x no change  Repeated Extension in Lying (REIL):  10x, no change    Observation: (11/13)  Pt has mild-mod clicking in L knee upon rising from chair in addition to L knee valgus.    (12/9/24): Pt has reduced L knee valgus and objectively less \"clicking\"     Tenderness/Palpation: (11/13)  Pt has moderate TTP to mid joint line of L knee    (1/24/25): No TTP to mid joint line of L knee    Special Tests:  (1/24/25)  Valgus Stress: (+)  Varus Stress: (-)  McMurrary's IR of the tibia + Varus stress = lateral meniscus: " "(-)  McMurrays ER of the tibia + Valgus stress = medial meniscus: (-)  Thessaly's: (+) on L  Lachman: (-)    Function:  (1/24/25)   Squatting: Pt demonstrates L knee valgus upon rising from a chair  Ambulation: Pt demonstrates mild L lean during stance phase on L    (1/24/25)  Squatting: Pt demonstrates reduced L knee valgus during STS, though continues to be anteriorly loaded/knee dominant w/this movement janae. During descent/eccentric portion.  Ambulation: Pt no longer presents w/ L lean during L stance phase.          Precautions: No past medical history on file.     SOC: 11/13/24  FOTO: 11/22/24  POC Expiration: 2/5/25  Last RE: 1/24/25  Daily Treatment Log:  Date 1/13/25 1/24/25 12/27/2024 1/2/2025 1/6/25   Visit # 16 17 13   14 15   Auth  16/99 17/99 13/99 14/99 15/99   Auth exp        Manual        KT medial \"C\" L knee         L knee distraction w/band        Edema Massage        R PF tape for lateral tilt correction        There Exer 10' 25' 20' 10' 10'   Objective Measures  SJ: ROM, MMT, MOVEMENT SCREEN      Squat/STS  2X10 at plinth; 18 in  x5 reps at 20\" tx table - painful    X5 reps at 22\" tx table    1x10 at 22\" tx table 1x10 at 22\" tx table, 2x 3x12 w/YTB at knees. 22in table height   TRX squat 2x10 to chair w/foam to decrease height       Hooklying pullover        Hooklying clam shells    Sidelying, GTB, 2x10 w/3s hold Sidelying, GTB, 1x12 w/3s hold    Glute bridges 3x12-15 w/ BTB at knees  3x12-15 w/ BTB at knees  3x10 w/BTB at knees 3x12 w/ BTB at knees  3x12-15 w/ BTB at knees    SLR flex w/ quad set        Stand march                        HEP        There Activ 15'  10' 10' 15'   Upright bike 3 min       Side stepping W/RTB, 3 laps in clinic  On foam balance beam 6' x 3 laps R/L at counter w/ CS, 2x On foam balance beam 6' x 4 laps R/L at counter w/ CS and intermittent UE support, 2x On foam balance beam 6' x 4 laps R/L at counter w/ CS and intermittent UE support, 4x w/ 2# ankle weights " "  Monster walks including retro walks   Open space RTB @ ankles 16'x1 ea fwd/bkwd    3/10 L medial knee pain w/ attempting additional lap; instructed to hold; pain subsides to 1/10 w/ stopping activity Open space GTB @ knees 16'x1 ea fwd/bkwd, 2x Open space GTB @ knees 16'x1 ea fwd/bkwd, 3x   DKTC w/ pball 1x10 B/L   1x10 B/L 1x10 B/L   Step Ups/Downs 2x10 R/L       Lateral Step ups/downs        Pt education     Pt educated on importance of performing HEP consistently after admittance to poor adherence at home. HEP updated to reflect today's changes.           NMReed 15' 15' 10' 25' 20'   Side lying hip abduction; GM bias Side lying, 2x10 w/2# weight  W/bent knees and GTB, 2x10 R/L VC for form W/bent knees and GTB, 2x10 R/L VC for form In standing, 3# ankle weights and VCs for core engagement   Skiiers  20x       B/L SAQ w/ ball sq @ ankles        Standing TKE w/ ball vs wall    RTB w/ uni rail 3\"x8 R/L, mild discomfort R knee    RDL w/SPC    1x10 B/L GTB at knee w/ single 5# DB, 2x 1x10 B/L GTB at knee w/ single 7# ankle weight on SPC, 3x   Knee ext. W/add 3x 10 w/3# on R/L 3x 10 w/3# on R/L      HL cane pullover        Palloff Press w/band around ankles   W/HARJIT and RTB at ankles; 7.5#, 2x10 R/L W/HARJIT and RTB at ankles; 7.5#, 2x10 R/L W/HARJIT and RTB at ankles; 7.5#, 2x10 R/L   Backward walk w/ knee hike    10' x 1 R/L w/ rail 10' x 1 R/L w/ rail   Seated HS curl 10x RTB, 3s hold   1x10 YTB R/L    SL         Manual OP to L knee in extension 10x5s       Modalities                                HEP:  Access Code: [email or web address]  URL: [email or web address]  Date: 01/06/2025  Prepared by: Fransisco Randall    Exercises  - Side Stepping with Resistance at Ankles  - 1-2 x daily - 7 x weekly - 1-3 sets - 10 reps  - Forward Monster Walk with Resistance (BKA)  - 1-2 x daily - 7 x weekly - 1-3 sets - 10 reps  - Supine Bridge with Resistance Band  - 1-2 x daily - 7 x weekly - 1-3 sets - 10 reps - 5 hold  - Standing " Hip Abduction with Counter Support  - 1-2 x daily - 7 x weekly - 1-3 sets - 10 reps  - Sit to Stand with Resistance Around Legs  - 1-2 x daily - 7 x weekly - 1-3 sets - 10 reps  - Standing Anti-Rotation Press with Anchored Resistance  - 1-2 x daily - 7 x weekly - 1-3 sets - 10 reps

## 2025-02-04 ENCOUNTER — OFFICE VISIT (OUTPATIENT)
Dept: PHYSICAL THERAPY | Facility: CLINIC | Age: 60
End: 2025-02-04
Payer: COMMERCIAL

## 2025-02-04 DIAGNOSIS — M17.12 PRIMARY OSTEOARTHRITIS OF LEFT KNEE: Primary | ICD-10-CM

## 2025-02-04 PROCEDURE — 97112 NEUROMUSCULAR REEDUCATION: CPT

## 2025-02-04 PROCEDURE — 97140 MANUAL THERAPY 1/> REGIONS: CPT

## 2025-02-04 PROCEDURE — 97110 THERAPEUTIC EXERCISES: CPT

## 2025-02-04 NOTE — PROGRESS NOTES
"Daily Note     Today's date: 2025  Patient name: Karly Yost  : 1965  MRN: [id number]  Referring provider: Lucius Mcpherson DO  Dx:   Encounter Diagnosis     ICD-10-CM    1. Primary osteoarthritis of left knee  M17.12                      Subjective: Pt reports to therapy citing 4/10 pain in her R knee as a peak pain t/o the past week. She adds that this was following two consecutive days walking in the city. This has previously been rated as 8/10 per pt following consecutive days in the city. Today she rates her L knee pain as a 2/10.       Objective: See treatment diary below      Assessment: Tolerated treatment well. Pt entered today's session w/the lowest recorded pain rating following 2 days walking in the city at 4/10. Pt found relief from this pain w/use of stick rolling to L ITB and L knee banded distraction at the beginning and end of session. Pt continues to progress w/SL balance interventions while still experiencing minor pain when performing lateral step-ups. Patient would benefit from continued PT to increase stability about the L knee to reduce clicking sensation while increasing activity tolerance.       Plan: Continue per plan of care.      Precautions: No past medical history on file.     SOC: 24  FOTO: 24  POC Expiration: 25  Last RE: 25  Daily Treatment Log:  Date 25   Visit # 16 17 18  14 15   Auth         Auth exp        Manual   5'     Stick rolling L ITB   SJ     KT medial \"C\" L knee         L knee distraction w/band   SJ     Edema Massage        R PF tape for lateral tilt correction                There Exer 10' 25' 15' 10' 10'   Objective Measures  SJ: ROM, MMT, MOVEMENT SCREEN      Squat/STS  2X10 at plinth; 18 in  2X10 at plinth; 18 in; YTB at knees 1x10 at 22\" tx table, 2x 3x12 w/YTB at knees. 22in table height   TRX squat 2x10 to chair w/foam to decrease height       Hooklying pullover        Hooklying clam shells     " "Sidelying, GTB, 1x12 w/3s hold    Glute bridges 3x12-15 w/ BTB at knees  3x12-15 w/ BTB at knees  3x12-15 w/ YPB 3x12 w/ BTB at knees  3x12-15 w/ BTB at knees    SLR flex w/ quad set        Stand march        Lateral Step ups/downs   2x10 R/L-Minor pain elicited             HEP        There Activ 15'   10' 15'   Upright bike 3 min       Side stepping W/RTB, 3 laps in clinic   On foam balance beam 6' x 4 laps R/L at counter w/ CS and intermittent UE support, 2x On foam balance beam 6' x 4 laps R/L at counter w/ CS and intermittent UE support, 4x w/ 2# ankle weights   Monster walks including retro walks    Open space GTB @ knees 16'x1 ea fwd/bkwd, 2x Open space GTB @ knees 16'x1 ea fwd/bkwd, 3x   DKTC w/ pball 1x10 B/L   1x10 B/L 1x10 B/L   Step Ups/Downs 2x10 R/L       Pt education     Pt educated on importance of performing HEP consistently after admittance to poor adherence at home. HEP updated to reflect today's changes.           NMReed 15' 15' 20' 25' 20'   Side lying hip abduction; GM bias Side lying, 2x10 w/2# weight   W/bent knees and GTB, 2x10 R/L VC for form In standing, 3# ankle weights and VCs for core engagement   Skiiers  20x  20x R/L     B/L SAQ w/ ball sq @ ankles        Standing TKE w/ ball vs wall    RTB w/ uni rail 3\"x8 R/L, mild discomfort R knee    RDL w/SPC   1x10 B/L GTB at knee w/ single 5# DB, 2x 1x10 B/L GTB at knee w/ single 5# DB, 2x 1x10 B/L GTB at knee w/ single 7# ankle weight on SPC, 3x   Knee ext. W/add 3x 10 w/3# on R/L 3x 10 w/3# on R/L 3x 10 w/3# on R/L     HL cane pullover        Palloff Press w/band around ankles   W/GTB and YTB at ankles; 2x10 R/L W/HARJIT and RTB at ankles; 7.5#, 2x10 R/L W/HARJIT and RTB at ankles; 7.5#, 2x10 R/L   Backward walk w/ knee hike    10' x 1 R/L w/ rail 10' x 1 R/L w/ rail   Seated HS curl 10x RTB, 3s hold   1x10 YTB R/L    SL         Manual OP to L knee in extension 10x5s               Modalities                                HEP:  Access Code: " [email or web address]  URL: [email or web address]  Date: 01/06/2025  Prepared by: Fransisco Randall    Exercises  - Side Stepping with Resistance at Ankles  - 1-2 x daily - 7 x weekly - 1-3 sets - 10 reps  - Forward Monster Walk with Resistance (BKA)  - 1-2 x daily - 7 x weekly - 1-3 sets - 10 reps  - Supine Bridge with Resistance Band  - 1-2 x daily - 7 x weekly - 1-3 sets - 10 reps - 5 hold  - Standing Hip Abduction with Counter Support  - 1-2 x daily - 7 x weekly - 1-3 sets - 10 reps  - Sit to Stand with Resistance Around Legs  - 1-2 x daily - 7 x weekly - 1-3 sets - 10 reps  - Standing Anti-Rotation Press with Anchored Resistance  - 1-2 x daily - 7 x weekly - 1-3 sets - 10 reps

## 2025-02-07 ENCOUNTER — OFFICE VISIT (OUTPATIENT)
Dept: PHYSICAL THERAPY | Facility: CLINIC | Age: 60
End: 2025-02-07
Payer: COMMERCIAL

## 2025-02-07 DIAGNOSIS — M17.12 PRIMARY OSTEOARTHRITIS OF LEFT KNEE: Primary | ICD-10-CM

## 2025-02-07 PROCEDURE — 97110 THERAPEUTIC EXERCISES: CPT

## 2025-02-07 PROCEDURE — 97140 MANUAL THERAPY 1/> REGIONS: CPT

## 2025-02-07 PROCEDURE — 97530 THERAPEUTIC ACTIVITIES: CPT

## 2025-02-07 NOTE — PROGRESS NOTES
"Daily Note     Today's date: 2025  Patient name: Karly Yost  : 1965  MRN: [id number]  Referring provider: Lucius Mcpherson DO  Dx:   Encounter Diagnosis     ICD-10-CM    1. Primary osteoarthritis of left knee  M17.12                      Subjective: Pt reports to therapy citing a minor increase in pain the medial aspect of the L knee following last session and two straight days of walking in the city. She adds that this pain has dissipated and rates her pain today as 2/10 in the same location.       Objective: See treatment diary below      Assessment: Tolerated treatment well. Pt continues to benefit from stick rolling and patellar mobs prior to performing interventions. Pt additionally finds relief w/ L knee distraction at end of session. This distraction can cause pain if directed slightly laterally, so a direct downward force w/slight medial bias is recommended when performing this technique. Pt saw an increase in resistance w/LAQs from 3# to 5# and had no c/o discomfort while experiencing a better contraction per pt. Patient would benefit from continued PT      Plan: Continue per plan of care.      Precautions: No past medical history on file.     SOC: 24  FOTO: 24  POC Expiration: 25  Last RE: 25  Daily Treatment Log:  Date 25   Visit # 16 17 18  19 15   Auth        Auth exp        Manual   5' 10'    Stick rolling L ITB   SJ SJ    KT medial \"C\" L knee         L knee distraction w/band   SJ SJ    Edema Massage        R PF tape for lateral tilt correction        Patellar mobs     All 4 directions            There Exer 10' 25' 15' 15' 10'   Objective Measures  SJ: ROM, MMT, MOVEMENT SCREEN      Squat/STS  2X10 at plinth; 18 in  2X10 at plinth; 18 in; YTB at knees 2X10 at plinth; 20 in; YTB at knees and long blue foam pad at feet 3x12 w/YTB at knees. 22in table height   TRX squat 2x10 to chair w/foam to decrease height       Hooklying " pullover        Hooklying clam shells     U/L, GTB 2x10    Glute bridges 3x12-15 w/ BTB at knees  3x12-15 w/ BTB at knees  3x12-15 w/ YPB 3x12-15 w/ YPB w/2-3s hold 3x12-15 w/ BTB at knees    SLR flex w/ quad set        Stand march        Lateral Step ups/downs   2x10 R/L-Minor pain elicited             HEP        There Activ 15'   15' 15'   Upright bike 3 min       Side stepping W/RTB, 3 laps in clinic   GTB, 3 laps of 15ft On foam balance beam 6' x 4 laps R/L at counter w/ CS and intermittent UE support, 4x w/ 2# ankle weights   Monster walks including retro walks    GTB, 3 laps of 15ft Open space GTB @ knees 16'x1 ea fwd/bkwd, 3x   DKTC w/ pball 1x10 B/L   1x10 B/L 1x10 B/L   Step Ups/Downs 2x10 R/L   2x10 R/L w/knee hike and 1s pause at top    Pt education     Pt educated on importance of performing HEP consistently after admittance to poor adherence at home. HEP updated to reflect today's changes.           NMReed 15' 15' 20' 5' 20'   Side lying hip abduction; GM bias Side lying, 2x10 w/2# weight    In standing, 3# ankle weights and VCs for core engagement   Skiiers  20x   20x R/L    B/L SAQ w/ ball sq @ ankles        Standing TKE w/ ball vs wall        RDL w/SPC   1x10 B/L GTB at knee w/ single 5# DB, 2x  1x10 B/L GTB at knee w/ single 7# ankle weight on SPC, 3x   Knee ext. W/add 3x 10 w/3# on R/L 3x 10 w/3# on R/L 3x 10 w/3# on R/L 3x 10 w/5# on R/L w/2s hold    HL cane pullover        Palloff Press w/band around ankles   W/GTB and YTB at ankles; 2x10 R/L  W/HARJIT and RTB at ankles; 7.5#, 2x10 R/L   Backward walk w/ knee hike     10' x 1 R/L w/ rail   Seated HS curl 10x RTB, 3s hold       Manual OP to L knee in extension 10x5s               Modalities                                HEP:  Access Code: [email or web address]  URL: [email or web address]  Date: 01/06/2025  Prepared by: Fransisco Randall    Exercises  - Side Stepping with Resistance at Ankles  - 1-2 x daily - 7 x weekly - 1-3 sets - 10 reps  - Forward  Monster Walk with Resistance (BKA)  - 1-2 x daily - 7 x weekly - 1-3 sets - 10 reps  - Supine Bridge with Resistance Band  - 1-2 x daily - 7 x weekly - 1-3 sets - 10 reps - 5 hold  - Standing Hip Abduction with Counter Support  - 1-2 x daily - 7 x weekly - 1-3 sets - 10 reps  - Sit to Stand with Resistance Around Legs  - 1-2 x daily - 7 x weekly - 1-3 sets - 10 reps  - Standing Anti-Rotation Press with Anchored Resistance  - 1-2 x daily - 7 x weekly - 1-3 sets - 10 reps

## 2025-02-10 ENCOUNTER — TELEPHONE (OUTPATIENT)
Dept: PHYSICAL THERAPY | Facility: CLINIC | Age: 60
End: 2025-02-10

## 2025-02-10 ENCOUNTER — APPOINTMENT (OUTPATIENT)
Dept: PHYSICAL THERAPY | Facility: CLINIC | Age: 60
End: 2025-02-10
Payer: COMMERCIAL

## 2025-02-10 NOTE — TELEPHONE ENCOUNTER
Called pt to check in as she was 5 min late to her appointment. Pt had the wrong time written down, and rescheduled her 2/10 appointment to 2/12 at 3:30

## 2025-02-12 ENCOUNTER — OFFICE VISIT (OUTPATIENT)
Dept: PHYSICAL THERAPY | Facility: CLINIC | Age: 60
End: 2025-02-12
Payer: COMMERCIAL

## 2025-02-12 DIAGNOSIS — M17.12 PRIMARY OSTEOARTHRITIS OF LEFT KNEE: Primary | ICD-10-CM

## 2025-02-12 PROCEDURE — 97140 MANUAL THERAPY 1/> REGIONS: CPT

## 2025-02-12 PROCEDURE — 97530 THERAPEUTIC ACTIVITIES: CPT

## 2025-02-12 PROCEDURE — 97110 THERAPEUTIC EXERCISES: CPT

## 2025-02-12 NOTE — PROGRESS NOTES
"Daily Note     Today's date: 2025  Patient name: Karly Yost  : 1965  MRN: [id number]  Referring provider: Lucius Mcpherson DO  Dx:   Encounter Diagnosis     ICD-10-CM    1. Primary osteoarthritis of left knee  M17.12                      Subjective: pt reports that her knee is getting better, reports to session w/ no pain       Objective: See treatment diary below      Assessment: Tolerated treatment well.Pt dem posterolateral hip fatigue. No increased L knee pain during or post session. Challenged w/ SL bridge progressions, cont to progress as tolerated.  Patient would benefit from continued PT      Plan: Continue per plan of care.      Precautions: No past medical history on file.     SOC: 24  FOTO: 24  POC Expiration: 25  Last RE: 25  Daily Treatment Log:  Date 25   Visit # 16 17 18  19 20   Auth     PCY    Auth exp        Manual   5' 10' 10'    Stick rolling L ITB   SJ SJ RB    KT medial \"C\" L knee         L knee distraction w/band   SJ SJ RB    Edema Massage        R PF tape for lateral tilt correction        Patellar mobs     All 4 directions RB            There Exer 10' 25' 15' 15' 15'    Objective Measures  SJ: ROM, MMT, MOVEMENT SCREEN      Squat/STS  2X10 at plinth; 18 in  2X10 at plinth; 18 in; YTB at knees 2X10 at plinth; 20 in; YTB at knees and long blue foam pad at feet GTB @ knees from chair foam under feet 2x10    TRX squat 2x10 to chair w/foam to decrease height       Hooklying pullover        Hooklying clam shells     U/L, GTB 2x10 U/L, GTB 2x10    Glute bridges 3x12-15 w/ BTB at knees  3x12-15 w/ BTB at knees  3x12-15 w/ YPB 3x12-15 w/ YPB w/2-3s hold GTB 5\"x10    Figure 4 SL bridge 2x5 R/L    SLR flex w/ quad set        Stand march        Lateral Step ups/downs   2x10 R/L-Minor pain elicited             HEP        There Activ 15'   15' 15'    Upright bike 3 min    L2x5'    Side stepping W/RTB, 3 laps in clinic   GTB, " "3 laps of 15ft GTB @ knees 20'x2    Monster walks including retro walks    GTB, 3 laps of 15ft GTB @ knees 20'x2    Std hip ext      GTB @ knees 2x10 R/L    DKTC w/ pball 1x10 B/L   1x10 B/L    Step Ups/Downs 2x10 R/L   2x10 R/L w/knee hike and 1s pause at top 6\" 1x10 R/L    Straddle step ups      6\" 1x10 R/L    Pt education                NMReed 15' 15' 20' 5'    Side lying hip abduction; GM bias Side lying, 2x10 w/2# weight       Skiiers  20x   20x R/L    B/L SAQ w/ ball sq @ ankles        Standing TKE w/ ball vs wall        RDL w/SPC   1x10 B/L GTB at knee w/ single 5# DB, 2x     Knee ext. W/add 3x 10 w/3# on R/L 3x 10 w/3# on R/L 3x 10 w/3# on R/L 3x 10 w/5# on R/L w/2s hold 5# 2x10 R/L    HL cane pullover        Palloff Press w/band around ankles   W/GTB and YTB at ankles; 2x10 R/L     Backward walk w/ knee hike        Seated HS curl 10x RTB, 3s hold       Manual OP to L knee in extension 10x5s               Modalities                                HEP:  Access Code: [email or web address]  URL: [email or web address]  Date: 01/06/2025  Prepared by: Fransisco Randall    Exercises  - Side Stepping with Resistance at Ankles  - 1-2 x daily - 7 x weekly - 1-3 sets - 10 reps  - Forward Monster Walk with Resistance (BKA)  - 1-2 x daily - 7 x weekly - 1-3 sets - 10 reps  - Supine Bridge with Resistance Band  - 1-2 x daily - 7 x weekly - 1-3 sets - 10 reps - 5 hold  - Standing Hip Abduction with Counter Support  - 1-2 x daily - 7 x weekly - 1-3 sets - 10 reps  - Sit to Stand with Resistance Around Legs  - 1-2 x daily - 7 x weekly - 1-3 sets - 10 reps  - Standing Anti-Rotation Press with Anchored Resistance  - 1-2 x daily - 7 x weekly - 1-3 sets - 10 reps              "

## 2025-02-14 ENCOUNTER — OFFICE VISIT (OUTPATIENT)
Dept: PHYSICAL THERAPY | Facility: CLINIC | Age: 60
End: 2025-02-14
Payer: COMMERCIAL

## 2025-02-14 DIAGNOSIS — M17.12 PRIMARY OSTEOARTHRITIS OF LEFT KNEE: Primary | ICD-10-CM

## 2025-02-14 PROCEDURE — 97110 THERAPEUTIC EXERCISES: CPT

## 2025-02-14 PROCEDURE — 97112 NEUROMUSCULAR REEDUCATION: CPT

## 2025-02-14 NOTE — PROGRESS NOTES
"Daily Note     Today's date: 2025  Patient name: Karly Yost  : 1965  MRN: [id number]  Referring provider: Lucius Mcpherson DO  Dx:   Encounter Diagnosis     ICD-10-CM    1. Primary osteoarthritis of left knee  M17.12                      Subjective: Pt reports to therapy citing 0/10 pain of the L knee. She adds that she only reported to the city for work on Monday this week, in which her L: knee pain peaked at a 2/10. This pain is presenting both acutely and at the end of prolonged activity, bust often following increased activity.       Objective: See treatment diary below      Assessment: Tolerated treatment well. Pt had the least amount of pain so far today t/o a PT session. Pt was able to tolerate 15 reps of STS/Squat w/o discomfort on the L knee.  Patient would benefit from continued PT      Plan: Continue per plan of care.      Precautions: No past medical history on file.     SOC: 24  FOTO: 24  POC Expiration: 25  Last RE: 25  Daily Treatment Log:  Date 25   Visit # 21 17 18  19 20   Auth     PCY    Auth exp        Manual 5'  5' 10' 10'    Stick rolling L ITB   SJ SJ RB    KT medial \"C\" L knee         L knee distraction w/band SJ 10x10s  SJ SJ RB    Edema Massage        R PF tape for lateral tilt correction        Patellar mobs  SJ   All 4 directions RB            There Exer 20' 25' 15' 15' 15'    Objective Measures  SJ: ROM, MMT, MOVEMENT SCREEN      HF flex off table 2x1 min each       Quad stretch w/SOS 2x 30s each       Squat/STS 2x15 at plinth w/foam under b/l feet 2X10 at plinth; 18 in  2X10 at plinth; 18 in; YTB at knees 2X10 at plinth; 20 in; YTB at knees and long blue foam pad at feet GTB @ knees from chair foam under feet 2x10    TRX squat        Hooklying pullover        Hooklying clam shells     U/L, GTB 2x10 U/L, GTB 2x10    Glute bridges 3x12-15 w/ BTB at knees  3x12-15 w/ BTB at knees  3x12-15 w/ YPB 3x12-15 w/ YPB " "w/2-3s hold GTB 5\"x10    Figure 4 SL bridge 2x5 R/L    SLR flex w/ quad set        Stand march        Lateral Step ups/downs   2x10 R/L-Minor pain elicited             HEP        There Activ    15' 15'    Upright bike     L2x5'    Side stepping Box walking, 10 laps w/RTB   GTB, 3 laps of 15ft GTB @ knees 20'x2    Monster walks including retro walks    GTB, 3 laps of 15ft GTB @ knees 20'x2    Std hip ext      GTB @ knees 2x10 R/L    DKTC w/ pball    1x10 B/L    Step Ups/Downs    2x10 R/L w/knee hike and 1s pause at top 6\" 1x10 R/L    Straddle step ups      6\" 1x10 R/L    Pt education                NMReed 15' 15' 20' 5'    Side lying hip abduction; GM bias 2x10       Skiiers 20x R/L ; 20x w/ ball toss  20x   20x R/L    B/L SAQ w/ ball sq @ ankles        Standing TKE w/ ball vs wall        RDL w/SPC   1x10 B/L GTB at knee w/ single 5# DB, 2x     Knee ext. W/add 3x 10 w/5# on R/L 3x 10 w/3# on R/L 3x 10 w/3# on R/L 3x 10 w/5# on R/L w/2s hold 5# 2x10 R/L    HL cane pullover        Palloff Press w/band around ankles   W/GTB and YTB at ankles; 2x10 R/L     Backward walk w/ knee hike        Seated HS curl        Manual OP to L knee in extension                Modalities                                HEP:  Access Code: [email or web address]  URL: [email or web address]  Date: 01/06/2025  Prepared by: Fransisco Randall    Exercises  - Side Stepping with Resistance at Ankles  - 1-2 x daily - 7 x weekly - 1-3 sets - 10 reps  - Forward Monster Walk with Resistance (BKA)  - 1-2 x daily - 7 x weekly - 1-3 sets - 10 reps  - Supine Bridge with Resistance Band  - 1-2 x daily - 7 x weekly - 1-3 sets - 10 reps - 5 hold  - Standing Hip Abduction with Counter Support  - 1-2 x daily - 7 x weekly - 1-3 sets - 10 reps  - Sit to Stand with Resistance Around Legs  - 1-2 x daily - 7 x weekly - 1-3 sets - 10 reps  - Standing Anti-Rotation Press with Anchored Resistance  - 1-2 x daily - 7 x weekly - 1-3 sets - 10 reps                "

## 2025-02-17 ENCOUNTER — APPOINTMENT (OUTPATIENT)
Dept: PHYSICAL THERAPY | Facility: CLINIC | Age: 60
End: 2025-02-17
Payer: COMMERCIAL

## 2025-02-17 ENCOUNTER — TELEPHONE (OUTPATIENT)
Dept: PHYSICAL THERAPY | Facility: CLINIC | Age: 60
End: 2025-02-17

## 2025-02-17 NOTE — TELEPHONE ENCOUNTER
Patient called to cancel her appt for today due to being sick. Plans to attend next visit as long as she is feeling better; was encouraged to call us towards the end of the week if she remains sick so we can remove her appt for Friday 2/21.

## 2025-02-21 ENCOUNTER — APPOINTMENT (OUTPATIENT)
Dept: PHYSICAL THERAPY | Facility: CLINIC | Age: 60
End: 2025-02-21
Payer: COMMERCIAL

## 2025-02-24 ENCOUNTER — OFFICE VISIT (OUTPATIENT)
Dept: PHYSICAL THERAPY | Facility: CLINIC | Age: 60
End: 2025-02-24
Payer: COMMERCIAL

## 2025-02-24 DIAGNOSIS — M17.12 PRIMARY OSTEOARTHRITIS OF LEFT KNEE: Primary | ICD-10-CM

## 2025-02-24 PROCEDURE — 97112 NEUROMUSCULAR REEDUCATION: CPT

## 2025-02-24 PROCEDURE — 97530 THERAPEUTIC ACTIVITIES: CPT

## 2025-02-24 PROCEDURE — 97110 THERAPEUTIC EXERCISES: CPT

## 2025-02-24 NOTE — PROGRESS NOTES
"Daily Note     Today's date: 2025  Patient name: Karly Yost  : 1965  MRN: [id number]  Referring provider: Lucius Mcpherson DO  Dx:   Encounter Diagnosis     ICD-10-CM    1. Primary osteoarthritis of left knee  M17.12                      Subjective: pt reports that she has no pain in her knee. She is feeling better after getting over the flu and strep last week.       Objective: See treatment diary below      Assessment: Tolerated treatment well. Pt had mild increase in knee pain w/ lateral heel taps but this did not linger beyond the exercise and was less than last time she performed this. Additional SL leg press today for isolated Sl strengthening, cont to progress as able. Patient would benefit from continued PT      Plan: Continue per plan of care.      Precautions: No past medical history on file.     SOC: 24  FOTO: 24  POC Expiration: 25  Last RE: 25  Daily Treatment Log:  Date 25   Visit # 21 22  19 20   Auth    PCY    PCY    Auth exp        Manual 5'   10' 10'    Stick rolling L ITB    SJ RB    KT medial \"C\" L knee         L knee distraction w/band SJ 10x10s   SJ RB    Edema Massage        R PF tape for lateral tilt correction        Patellar mobs  SJ   All 4 directions RB            There Exer 20' 15'  15' 15'    Objective Measures        HF flex off table 2x1 min each 1' x2 R/L       Quad stretch w/SOS 2x 30s each       Squat/STS 2x15 at plinth w/foam under b/l feet   2X10 at plinth; 20 in; YTB at knees and long blue foam pad at feet GTB @ knees from chair foam under feet 2x10    Supine SL leg press   10# 2x10 R/L  15#      TRX squat  STS butt taps to low mat 1x10; 2x       Hooklying pullover        Hooklying clam shells     U/L, GTB 2x10 U/L, GTB 2x10    Glute bridges 3x12-15 w/ BTB at knees    3x12-15 w/ YPB w/2-3s hold GTB 5\"x10    Figure 4 SL bridge 2x5 R/L    SLR flex w/ quad set        Stand march        Lateral Step ups/downs " " 6\" 1x10 R/L    Pain in R> L LE               HEP        There Activ  10'  15' 15'    Upright bike  L3x5'    L2x5'    Side stepping Box walking, 10 laps w/RTB   GTB, 3 laps of 15ft GTB @ knees 20'x2    Monster walks including retro walks  GTB @ knees 15'x2   GTB, 3 laps of 15ft GTB @ knees 20'x2            Std hip ext      GTB @ knees 2x10 R/L    DKTC w/ pball    1x10 B/L    Step Ups/Downs    2x10 R/L w/knee hike and 1s pause at top 6\" 1x10 R/L            Straddle step ups   6\" 1x10 R/L    6\" 1x10 R/L    Pt education                NMReed 15' 15'  5'    Side lying hip abduction; GM bias 2x10       Skiiers 20x R/L ; 20x w/ ball toss  20x R/L ; 20x w/ ball toss    20x R/L    B/L SAQ w/ ball sq @ ankles        Standing TKE w/ ball vs wall        RDL w/SPC  4# DB near window 1x10 R/L; 2x      Knee ext. W/add 3x 10 w/5# on R/L 5# 5\" 2x15 R/L   3x 10 w/5# on R/L w/2s hold 5# 2x10 R/L    HL cane pullover        Palloff Press w/band around ankles        Backward walk w/ knee hike        Seated HS curl        Manual OP to L knee in extension                Modalities                                HEP:  Access Code: [email or web address]  URL: [email or web address]  Date: 01/06/2025  Prepared by: Fransisco Randall    Exercises  - Side Stepping with Resistance at Ankles  - 1-2 x daily - 7 x weekly - 1-3 sets - 10 reps  - Forward Monster Walk with Resistance (BKA)  - 1-2 x daily - 7 x weekly - 1-3 sets - 10 reps  - Supine Bridge with Resistance Band  - 1-2 x daily - 7 x weekly - 1-3 sets - 10 reps - 5 hold  - Standing Hip Abduction with Counter Support  - 1-2 x daily - 7 x weekly - 1-3 sets - 10 reps  - Sit to Stand with Resistance Around Legs  - 1-2 x daily - 7 x weekly - 1-3 sets - 10 reps  - Standing Anti-Rotation Press with Anchored Resistance  - 1-2 x daily - 7 x weekly - 1-3 sets - 10 reps                  "

## 2025-02-28 ENCOUNTER — EVALUATION (OUTPATIENT)
Dept: PHYSICAL THERAPY | Facility: CLINIC | Age: 60
End: 2025-02-28
Payer: COMMERCIAL

## 2025-02-28 DIAGNOSIS — M17.12 PRIMARY OSTEOARTHRITIS OF LEFT KNEE: Primary | ICD-10-CM

## 2025-02-28 PROCEDURE — 97110 THERAPEUTIC EXERCISES: CPT

## 2025-02-28 NOTE — PROGRESS NOTES
"PT Evaluation     Today's date: 2025  Patient name: Karly Yost  : 1965  MRN: [id number]  Referring provider: Lucius Mcpherson DO  Dx:   Encounter Diagnosis     ICD-10-CM    1. Primary osteoarthritis of left knee  M17.12                          Assessment  Impairments: abnormal or restricted ROM, abnormal movement, activity intolerance, impaired physical strength, pain with function, poor body mechanics and activity limitations  Symptom irritability: moderate    Assessment details: 25:  Pt presents to therapy now 4 weeks removed from her last RE. Pt has made significant strength gains while maintain knee flexion and extension both at or above WNL (125/0) about the R knee. Pt also demonstrates improved hip strength measuring at least a 4+/5 in all planes w/o pain. Pt no longer feels L knee pain t/o the day unless it was spent walking in [facility] for work. At this point, pt rates her pain as \"1 or 2\" out of 10. She adds that this pain tends to present more when she has been less compliant w/HEP, but adds that she has been increasingly compliant. Pt feels confident when prompted about her ability to manage her s/x at home. Pt is both agreeable and appropriate to d/c home w/HEP at this time.   Understanding of Dx/Px/POC: good     Prognosis: good    Goals  Short Term Goals:  Target Date 4 weeks (24)  STG1. Initiate and advance HEP to maximize progress between therapy sessions.-met  STG2. Improve L hip strength to 4/5 to prevent L knee valgus during sit to stand transfer.-met  STG3. Reduce pain w/ WB activity including walking to <2/10 to improve community participation.-met    Long Term Goals:  Target Date 12 weeks (25)  LTG1. Pt to be Indep with HEP  to maximize progress between therapy sessions and improve functional mobility.-MET  LTG2. Pt to tolerate prolonged walking on uneven terrain for 20+ minutes to return to work at her office.-Met  LTG3. Pt to demo L hip strength of 4+/5 or better such that " "pt can perform reciprocal stair negotiation pain free.-Met         Subjective Evaluation    History of Present Illness  Date of onset: 10/11/2024  Mechanism of injury: 25:  Pt reports to therapy now 4 weeks removed from last RE. She states that she has no current knee pain an adds the he maximum pain level is \"1 or 2 out of 10\" This pain presents following a day of walking in [place] for work, which has been a consistent source of pain t/o PT though steadily improving. Pt has been increasingly consistent w/HEP which she believes has helped her L knee pain significantly. At this point pt is both agreeable and appropriate for d/c home w/HEP.           Recurrent probem    Quality of life: good    Patient Goals  Patient goals for therapy: increased strength, independence with ADLs/IADLs, return to work, increased motion, improved balance and decreased pain  Patient goal: [place] shopping w/the family  Pain  Current pain ratin  At best pain ratin  At worst pain ratin  Location: Distal to L patella, medial L knee.  Quality: dull ache and sharp  Relieving factors: rest and change in position  Aggravating factors: walking, stair climbing and standing  Progression: worsening    Social Support  Steps to enter house: yes  4  Stairs in house: yes   16  Lives in: multiple-level home  Lives with: spouse    Employment status: working  Hand dominance: right    Treatments  Previous treatment: injection treatment and medication (Meloxicam)      Objective    (*=pain)      Knee AROM: Degrees      R  L  R (25) L (25)  Extension(Quad set):  0  0  0  0  Extension Lag (SLR):   -1  -3  0  0  Flexion: (Supine/Prone) 122  117  125  125      Strength: MMT  Hip    R  L  R (25) L (25)  IR(Seated/Supine)  4/5*  4+/5*  5/5  5/5  ER(Seated/Supine)  4+/5  4-/5  5/5  5/5  Flexion(Seated/Supine) 4/5  4-/5*  5/5  5/5  Abduction(S/L)  4-/5*  4-/5*  4+/5  4+/5  Abduction GM " "bias  3+/5*  3+/5*  4+/5  4+/5    Knee    R  L  R (1/24/25) L (1/24/25)  Extension(Seated)  4/5*  4-/5*  5/5  5/5    Flexion(Seated)  5/5  5/5  5/5  5/5    Ankle    R  L  R (1/24/25) L (1/24/25)  Dorsiflexion(Seated)  4+/5*  4-/5  5/5  5/5  Plantarflexion(Seated) 5/5  5/5  5/5  5/5    L/S Mobility: (11/13)    Flexion: Min loss  Ext: No loss  Side bend L: Min loss w/pain  Side bend R: No loss    Mechanical Assessment L/S: (11/13)    Pre-test symptoms include: 4/10 pain to L knee when walking  Repeated Extension in Standing (ANYI): 10x, no change, 10x no change  Repeated Extension in Lying (REIL):  10x, no change    Observation: (11/13)  Pt has mild-mod clicking in L knee upon rising from chair in addition to L knee valgus.    (12/9/24): Pt has reduced L knee valgus and objectively less \"clicking\"     (2/28/25): Pt has no reported clicking w/ambulation and demonstrate no valgus during ambulation.    Tenderness/Palpation: (11/13)  Pt has moderate TTP to mid joint line of L knee    (1/24/25): No TTP to mid joint line of L knee  (2/28/25): No TTP remains    Special Tests:  (2/28/25)  Valgus Stress: (-)  Varus Stress: (-)  McMurrary's IR of the tibia + Varus stress = lateral meniscus: (-)  McMurrays ER of the tibia + Valgus stress = medial meniscus: (-)  Thessaly's: (-) on L  Lachman: (-)    Function:  (2/28/25)   Squatting: Pt demonstrates no L knee valgus upon rising from a chair  Ambulation: Pt demonstrates no L lean during stance phase on L    (1/24/25)  Squatting: Pt demonstrates reduced L knee valgus during STS, though continues to be anteriorly loaded/knee dominant w/this movement janae. During descent/eccentric portion.  Ambulation: Pt no longer presents w/ L lean during L stance phase.          Precautions: No past medical history on file.     SOC: 11/13/24  FOTO: 11/22/24  POC Expiration: 2/5/25  Last RE: 1/24/25  Daily Treatment Log:  Date 2/14/25 2/24/2025 2/28/28 2/7/25 2/12/25   Visit # 21 22 23 19 20   Auth   " "9/30 PCY  10/30 PCY 2/30 7/30 PCY    Auth exp        Manual 5'  5' 10' 10'    Stick rolling L ITB    SJ RB    KT medial \"C\" L knee         L knee distraction w/band SJ 10x10s   SJ RB    Edema Massage        R PF tape for lateral tilt correction        Patellar mobs  SJ  SJ All 4 directions RB            There Exer 20' 15' 30' 15' 15'    Objective Measures   SJ     HF flex off table 2x1 min each 1' x2 R/L  1' x2 R/L      Quad stretch w/SOS 2x 30s each  2x 30s each     Squat/STS 2x15 at plinth w/foam under b/l feet  20 at plinth 2X10 at plinth; 20 in; YTB at knees and long blue foam pad at feet GTB @ knees from chair foam under feet 2x10    Supine SL leg press   10# 2x10 R/L       TRX squat  STS butt taps to low mat 1x10; 2x       Hooklying pullover        Hooklying clam shells     U/L, GTB 2x10 U/L, GTB 2x10    Glute bridges 3x12-15 w/ BTB at knees   3x12-15 w/ BTB at knees  3x12-15 w/ YPB w/2-3s hold GTB 5\"x10    Figure 4 SL bridge 2x5 R/L    SLR flex w/ quad set        Stand march        Lateral Step ups/downs  6\" 1x10 R/L    Pain in R> L LE               HEP        There Activ  10'  15' 15'    Upright bike  L3x5'    L2x5'    Side stepping Box walking, 10 laps w/RTB   GTB, 3 laps of 15ft GTB @ knees 20'x2    Monster walks including retro walks  GTB @ knees 15'x2   GTB, 3 laps of 15ft GTB @ knees 20'x2            Std hip ext      GTB @ knees 2x10 R/L    DKTC w/ pball    1x10 B/L    Step Ups/Downs    2x10 R/L w/knee hike and 1s pause at top 6\" 1x10 R/L            Straddle step ups   6\" 1x10 R/L    6\" 1x10 R/L    Pt education                NMReed 15' 15'  5'    Side lying hip abduction; GM bias 2x10       Skiiers 20x R/L ; 20x w/ ball toss  20x R/L ; 20x w/ ball toss    20x R/L    B/L SAQ w/ ball sq @ ankles        Standing TKE w/ ball vs wall        RDL w/SPC  4# DB near window 1x10 R/L; 2x      Knee ext. W/add 3x 10 w/5# on R/L 5# 5\" 2x15 R/L   3x 10 w/5# on R/L w/2s hold 5# 2x10 R/L    HL cane pullover      "   Palloff Press w/band around ankles        Backward walk w/ knee hike        Seated HS curl        Manual OP to L knee in extension                Modalities                                Access Code: [email or web address]  URL: [email or web address]  Date: 02/28/2025  Prepared by: Fransisco Randall    Exercises  - Side Stepping with Resistance at Ankles  - 1-2 x daily - 7 x weekly - 1-3 sets - 10 reps  - Forward Monster Walk with Resistance (BKA)  - 1-2 x daily - 7 x weekly - 1-3 sets - 10 reps  - Supine Bridge with Resistance Band  - 1-2 x daily - 7 x weekly - 1-3 sets - 10 reps - 5 hold  - Standing Hip Abduction with Counter Support  - 1-2 x daily - 7 x weekly - 1-3 sets - 10 reps  - Sit to Stand with Resistance Around Legs  - 1-2 x daily - 7 x weekly - 1-3 sets - 10 reps  - Standing Anti-Rotation Press with Anchored Resistance  - 1-2 x daily - 7 x weekly - 1-3 sets - 10 reps  - Skii Hops  - 1 x daily - 7 x weekly - 2 sets - 10 reps  - Prone Quadriceps Stretch with Strap  - 1 x daily - 7 x weekly - 3 sets - 3 reps - 30s hold